# Patient Record
Sex: FEMALE | Race: WHITE | NOT HISPANIC OR LATINO | URBAN - METROPOLITAN AREA
[De-identification: names, ages, dates, MRNs, and addresses within clinical notes are randomized per-mention and may not be internally consistent; named-entity substitution may affect disease eponyms.]

---

## 2021-06-25 ENCOUNTER — OUTPATIENT (OUTPATIENT)
Dept: OUTPATIENT SERVICES | Facility: HOSPITAL | Age: 46
LOS: 1 days | End: 2021-06-25
Payer: COMMERCIAL

## 2021-06-25 DIAGNOSIS — Z00.00 ENCOUNTER FOR GENERAL ADULT MEDICAL EXAMINATION WITHOUT ABNORMAL FINDINGS: ICD-10-CM

## 2021-06-25 PROCEDURE — 71046 X-RAY EXAM CHEST 2 VIEWS: CPT

## 2021-06-25 PROCEDURE — 71046 X-RAY EXAM CHEST 2 VIEWS: CPT | Mod: 26

## 2024-01-19 ENCOUNTER — INPATIENT (INPATIENT)
Facility: HOSPITAL | Age: 49
LOS: 6 days | Discharge: HOME | End: 2024-01-26
Attending: PSYCHIATRY & NEUROLOGY | Admitting: PSYCHIATRY & NEUROLOGY
Payer: COMMERCIAL

## 2024-01-19 VITALS
TEMPERATURE: 98 F | RESPIRATION RATE: 18 BRPM | SYSTOLIC BLOOD PRESSURE: 147 MMHG | HEART RATE: 81 BPM | DIASTOLIC BLOOD PRESSURE: 90 MMHG | OXYGEN SATURATION: 100 %

## 2024-01-19 DIAGNOSIS — F29 UNSPECIFIED PSYCHOSIS NOT DUE TO A SUBSTANCE OR KNOWN PHYSIOLOGICAL CONDITION: ICD-10-CM

## 2024-01-19 LAB
ALBUMIN SERPL ELPH-MCNC: 4.5 G/DL — SIGNIFICANT CHANGE UP (ref 3.3–5)
ALP SERPL-CCNC: 86 U/L — SIGNIFICANT CHANGE UP (ref 40–120)
ALT FLD-CCNC: 21 U/L — SIGNIFICANT CHANGE UP (ref 4–33)
AMPHET UR-MCNC: NEGATIVE — SIGNIFICANT CHANGE UP
ANION GAP SERPL CALC-SCNC: 12 MMOL/L — SIGNIFICANT CHANGE UP (ref 7–14)
APAP SERPL-MCNC: <10 UG/ML — LOW (ref 15–25)
APPEARANCE UR: ABNORMAL
AST SERPL-CCNC: 24 U/L — SIGNIFICANT CHANGE UP (ref 4–32)
BACTERIA # UR AUTO: ABNORMAL /HPF
BARBITURATES UR SCN-MCNC: NEGATIVE — SIGNIFICANT CHANGE UP
BASOPHILS # BLD AUTO: 0.03 K/UL — SIGNIFICANT CHANGE UP (ref 0–0.2)
BASOPHILS NFR BLD AUTO: 0.3 % — SIGNIFICANT CHANGE UP (ref 0–2)
BENZODIAZ UR-MCNC: NEGATIVE — SIGNIFICANT CHANGE UP
BILIRUB SERPL-MCNC: 1.6 MG/DL — HIGH (ref 0.2–1.2)
BILIRUB UR-MCNC: NEGATIVE — SIGNIFICANT CHANGE UP
BUN SERPL-MCNC: 8 MG/DL — SIGNIFICANT CHANGE UP (ref 7–23)
CALCIUM SERPL-MCNC: 9.3 MG/DL — SIGNIFICANT CHANGE UP (ref 8.4–10.5)
CAST: 2 /LPF — SIGNIFICANT CHANGE UP (ref 0–4)
CHLORIDE SERPL-SCNC: 101 MMOL/L — SIGNIFICANT CHANGE UP (ref 98–107)
CO2 SERPL-SCNC: 25 MMOL/L — SIGNIFICANT CHANGE UP (ref 22–31)
COCAINE METAB.OTHER UR-MCNC: NEGATIVE — SIGNIFICANT CHANGE UP
COLOR SPEC: YELLOW — SIGNIFICANT CHANGE UP
COVID-19 SPIKE DOMAIN AB INTERP: POSITIVE
COVID-19 SPIKE DOMAIN ANTIBODY RESULT: >250 U/ML — HIGH
CREAT SERPL-MCNC: 0.71 MG/DL — SIGNIFICANT CHANGE UP (ref 0.5–1.3)
CREATININE URINE RESULT, DAU: 126 MG/DL — SIGNIFICANT CHANGE UP
DIFF PNL FLD: NEGATIVE — SIGNIFICANT CHANGE UP
EGFR: 105 ML/MIN/1.73M2 — SIGNIFICANT CHANGE UP
EOSINOPHIL # BLD AUTO: 0.1 K/UL — SIGNIFICANT CHANGE UP (ref 0–0.5)
EOSINOPHIL NFR BLD AUTO: 1 % — SIGNIFICANT CHANGE UP (ref 0–6)
ETHANOL SERPL-MCNC: <10 MG/DL — SIGNIFICANT CHANGE UP
FLUAV AG NPH QL: SIGNIFICANT CHANGE UP
FLUBV AG NPH QL: SIGNIFICANT CHANGE UP
GLUCOSE SERPL-MCNC: 129 MG/DL — HIGH (ref 70–99)
GLUCOSE UR QL: NEGATIVE MG/DL — SIGNIFICANT CHANGE UP
HCT VFR BLD CALC: 44.1 % — SIGNIFICANT CHANGE UP (ref 34.5–45)
HGB BLD-MCNC: 14.8 G/DL — SIGNIFICANT CHANGE UP (ref 11.5–15.5)
IANC: 7.9 K/UL — HIGH (ref 1.8–7.4)
IMM GRANULOCYTES NFR BLD AUTO: 0.4 % — SIGNIFICANT CHANGE UP (ref 0–0.9)
KETONES UR-MCNC: ABNORMAL MG/DL
LEUKOCYTE ESTERASE UR-ACNC: NEGATIVE — SIGNIFICANT CHANGE UP
LYMPHOCYTES # BLD AUTO: 1.14 K/UL — SIGNIFICANT CHANGE UP (ref 1–3.3)
LYMPHOCYTES # BLD AUTO: 11.5 % — LOW (ref 13–44)
MCHC RBC-ENTMCNC: 28 PG — SIGNIFICANT CHANGE UP (ref 27–34)
MCHC RBC-ENTMCNC: 33.6 GM/DL — SIGNIFICANT CHANGE UP (ref 32–36)
MCV RBC AUTO: 83.5 FL — SIGNIFICANT CHANGE UP (ref 80–100)
METHADONE UR-MCNC: NEGATIVE — SIGNIFICANT CHANGE UP
MONOCYTES # BLD AUTO: 0.69 K/UL — SIGNIFICANT CHANGE UP (ref 0–0.9)
MONOCYTES NFR BLD AUTO: 7 % — SIGNIFICANT CHANGE UP (ref 2–14)
NEUTROPHILS # BLD AUTO: 7.9 K/UL — HIGH (ref 1.8–7.4)
NEUTROPHILS NFR BLD AUTO: 79.8 % — HIGH (ref 43–77)
NITRITE UR-MCNC: NEGATIVE — SIGNIFICANT CHANGE UP
NRBC # BLD: 0 /100 WBCS — SIGNIFICANT CHANGE UP (ref 0–0)
NRBC # FLD: 0 K/UL — SIGNIFICANT CHANGE UP (ref 0–0)
OPIATES UR-MCNC: NEGATIVE — SIGNIFICANT CHANGE UP
OXYCODONE UR-MCNC: NEGATIVE — SIGNIFICANT CHANGE UP
PCP SPEC-MCNC: SIGNIFICANT CHANGE UP
PCP UR-MCNC: NEGATIVE — SIGNIFICANT CHANGE UP
PH UR: 6.5 — SIGNIFICANT CHANGE UP (ref 5–8)
PLATELET # BLD AUTO: 202 K/UL — SIGNIFICANT CHANGE UP (ref 150–400)
POTASSIUM SERPL-MCNC: 3.8 MMOL/L — SIGNIFICANT CHANGE UP (ref 3.5–5.3)
POTASSIUM SERPL-SCNC: 3.8 MMOL/L — SIGNIFICANT CHANGE UP (ref 3.5–5.3)
PROT SERPL-MCNC: 8.5 G/DL — HIGH (ref 6–8.3)
PROT UR-MCNC: NEGATIVE MG/DL — SIGNIFICANT CHANGE UP
RBC # BLD: 5.28 M/UL — HIGH (ref 3.8–5.2)
RBC # FLD: 13 % — SIGNIFICANT CHANGE UP (ref 10.3–14.5)
RBC CASTS # UR COMP ASSIST: 2 /HPF — SIGNIFICANT CHANGE UP (ref 0–4)
REVIEW: SIGNIFICANT CHANGE UP
RSV RNA NPH QL NAA+NON-PROBE: SIGNIFICANT CHANGE UP
SALICYLATES SERPL-MCNC: <0.3 MG/DL — LOW (ref 15–30)
SARS-COV-2 IGG+IGM SERPL QL IA: >250 U/ML — HIGH
SARS-COV-2 IGG+IGM SERPL QL IA: POSITIVE
SARS-COV-2 RNA SPEC QL NAA+PROBE: SIGNIFICANT CHANGE UP
SARS-COV-2 RNA SPEC QL NAA+PROBE: SIGNIFICANT CHANGE UP
SODIUM SERPL-SCNC: 138 MMOL/L — SIGNIFICANT CHANGE UP (ref 135–145)
SP GR SPEC: 1.01 — SIGNIFICANT CHANGE UP (ref 1–1.03)
SQUAMOUS # UR AUTO: 21 /HPF — HIGH (ref 0–5)
THC UR QL: NEGATIVE — SIGNIFICANT CHANGE UP
TOXICOLOGY SCREEN, DRUGS OF ABUSE, SERUM RESULT: SIGNIFICANT CHANGE UP
TSH SERPL-MCNC: 2.08 UIU/ML — SIGNIFICANT CHANGE UP (ref 0.27–4.2)
UROBILINOGEN FLD QL: 0.2 MG/DL — SIGNIFICANT CHANGE UP (ref 0.2–1)
WBC # BLD: 9.9 K/UL — SIGNIFICANT CHANGE UP (ref 3.8–10.5)
WBC # FLD AUTO: 9.9 K/UL — SIGNIFICANT CHANGE UP (ref 3.8–10.5)
WBC UR QL: 4 /HPF — SIGNIFICANT CHANGE UP (ref 0–5)

## 2024-01-19 PROCEDURE — 70450 CT HEAD/BRAIN W/O DYE: CPT | Mod: 26,MA

## 2024-01-19 PROCEDURE — 99285 EMERGENCY DEPT VISIT HI MDM: CPT

## 2024-01-19 RX ORDER — RISPERIDONE 4 MG/1
0.5 TABLET ORAL
Refills: 0 | Status: DISCONTINUED | OUTPATIENT
Start: 2024-01-19 | End: 2024-01-22

## 2024-01-19 RX ORDER — HALOPERIDOL DECANOATE 100 MG/ML
2 INJECTION INTRAMUSCULAR EVERY 6 HOURS
Refills: 0 | Status: DISCONTINUED | OUTPATIENT
Start: 2024-01-19 | End: 2024-01-26

## 2024-01-19 RX ORDER — ACETAMINOPHEN 500 MG
650 TABLET ORAL EVERY 6 HOURS
Refills: 0 | Status: DISCONTINUED | OUTPATIENT
Start: 2024-01-19 | End: 2024-01-26

## 2024-01-19 RX ORDER — HALOPERIDOL DECANOATE 100 MG/ML
2.5 INJECTION INTRAMUSCULAR ONCE
Refills: 0 | Status: DISCONTINUED | OUTPATIENT
Start: 2024-01-19 | End: 2024-01-26

## 2024-01-19 RX ADMIN — Medication 0.5 MILLIGRAM(S): at 20:25

## 2024-01-19 RX ADMIN — RISPERIDONE 0.5 MILLIGRAM(S): 4 TABLET ORAL at 20:25

## 2024-01-19 RX ADMIN — Medication 2 MILLIGRAM(S): at 06:56

## 2024-01-19 NOTE — ED BEHAVIORAL HEALTH NOTE - BEHAVIORAL HEALTH NOTE
Writer called patient's daughter Ce  who provided the following information. Patient resides with her , father in law, son and daughter.  Patient is employed as an  for the past year.  She leaves the home 3am and returns around 3pm.  Patient last worked on Wednesday.  Patient does not have a primary care doctor, but states she went to see a doctor yesterday because she wasn't feeling well.  The doctor noted patient's blood pressure to be elevated and prescribed and anti inflammatory and an antibiotic, but she does not know the names.  States patient took two doses yesterday.  She does not know which doctor patient went to see as it's not someone patient sees regularly.    She states patient today was delusional, hearing voices and paranoid.  She reports for the past 2 weeks patient has had decreased sleep and saying some things like she's stressed at work, people at work want to get her fired and she will lose her house.  Today patient was talking about someone wanting to hurt everyone, patient locked the doors to the home, said people are working together.  She verbalized hearing family members voices mixed with strangers voices and conspiracies about killing them so she decided to bring patient to the emergency room.  She reports patient has a schizophrenic brother who was diagnosed when he was in college.  Patient has no history of violence, no history of suicidal threats.  She states patient took knives out today and placed them on the couch in case someone came in to the home to protect herself.    She is unsure if the family wants patient admitted to the hospital.  Writer requested her father's phone number.      Writer called patient's  Дмитрий  who states patient's symptoms started yesterday.  He then stated patient was paranoid two weeks ago and reporting auditory hallucinations that something was going to happen.  He states patient was worried about their kids son 21 and daughter 19.  He reports patient went to the doctor yesterday and got some antibiotics, but doesn't know which one, states his kids would know.  He states his kids would know which doctor she went to.  He states he didn't know patient was trying to protect herself with knives and feels if patient needs to get admitted to the hospital he is in agreement.  Patient has no previous psychiatric admissions.  Writer reserved a bed at [facility].

## 2024-01-19 NOTE — ED BEHAVIORAL HEALTH ASSESSMENT NOTE - RISK ASSESSMENT
Risk factors:  not receiving treatment    Protective factors: no current SIIP/HIIP, no h/o SA/SIB, no h/o psych admissions, no access to weapons, no active substance abuse, good physical health,  engaged in work or school, dependent children, domiciled, intact marriage, social supports    Overall, pt is a low risk of harm to self but is a moderate-high risk of harm to others and ___ requires psychiatric admission for safety and stabilization.

## 2024-01-19 NOTE — ED ADULT NURSE NOTE - OBJECTIVE STATEMENT
Pt arrives to room 13 with daughter at bedside. Pt is A&Ox4, respirations are even and unlabored. Ambulatory at baseline. Pt C/O intermittent auditory hallucinations x2 weeks. Pt states the voices are threatening to harm her and her family. Daughter states pt started barricading their home front door and pulling out knifes in effort to "protect the family". Daughter denies pt attempting to hurt her or her family. Pt denies SI, HI, visual and tactile hallucinations. Pt denies PMHx, medication use, or drug use. Pt endorses "a lot of stress in her life lately". Abdomen is soft and nondistended. Capillary refill is 2 seconds bilaterally. Skin is clean, dry, and intact. 20G IV placed in right AC. Medicated as per MD order. Bed in lowest position, safety maintained.

## 2024-01-19 NOTE — ED BEHAVIORAL HEALTH NOTE - BEHAVIORAL HEALTH NOTE
Attempted to see patient, but she is sleeping. Pt received Ativan earlier for anxiety. Dtr at bedside. Pt is not able to sustain long enough period of wakefulness to appropriately participate with interview.  Will return to re-attempt.   labs/chart reviewed.

## 2024-01-19 NOTE — ED ADULT TRIAGE NOTE - CHIEF COMPLAINT QUOTE
Patient c/o insomnia x 2 days and auditory hallucinations x 2 weeks. States voices are telling her that someone is after her family. Denies SI/HI, visual hallucinations and drug/ETOH use. Denies psychiatric and medical hx.

## 2024-01-19 NOTE — ED BEHAVIORAL HEALTH ASSESSMENT NOTE - DESCRIPTION
Pt has been calm, was given Ativan earlier for anxiety.    Vital Signs Last 24 Hrs  T(C): 37.2 (19 Jan 2024 11:50), Max: 37.2 (19 Jan 2024 11:50)  T(F): 99 (19 Jan 2024 11:50), Max: 99 (19 Jan 2024 11:50)  HR: 74 (19 Jan 2024 11:50) (74 - 81)  BP: 117/60 (19 Jan 2024 11:50) (117/60 - 173/70)  BP(mean): 97 (19 Jan 2024 07:51) (97 - 97)  RR: 18 (19 Jan 2024 11:50) (17 - 18)  SpO2: 100% (19 Jan 2024 11:50) (100% - 100%)    Parameters below as of 19 Jan 2024 11:50  Patient On (Oxygen Delivery Method): room air denies , 2 adult children; works as a  ([employer]);

## 2024-01-19 NOTE — ED BEHAVIORAL HEALTH ASSESSMENT NOTE - PSYCHIATRIC ISSUES AND PLAN (INCLUDE STANDING AND PRN MEDICATION)
Start risperdal 0.5mg bid; haldol 2.5 po/im q6hrs; Ativan 1mg po/im q 6prn Start risperdal 0.5mg bid; Ativan 0.5mg hs; haldol 2.5 po/im q6hrs; Ativan 1mg po/im q 6prn

## 2024-01-19 NOTE — ED BEHAVIORAL HEALTH ASSESSMENT NOTE - SUMMARY
Pt is a 47yo , employed female, with 2 adult children. Pt has no prior psychiatric history nor medical history. She was brought to the ED out of concern by her family for paranoia and insomnia.  Here in the ED, the pt has been calm, cooperative. She received Ativan PO prior to initial attempt at interview then slept for a few hours. Pt is now awake, and alert.  Pt currently presents with paranoid, AH, insomnia, with bright affect. Pt's insight into her symptoms is poor= pt does not seem to mind AH, as she correlates them to her Sikh beliefs. Family is concerned. Pt is acting out on her delusions, now taking out knives to protect her family, though she denies any actual current intent to harm anyone. Pt is a danger to others due to her psychotic sxs of new onset.   Pt does not appear to have a clear cut prodromal depressive/mood episode, but has a stressful trigger.  Pt is not safe to return home at this time, as there is a moderate to high chance she will continue to act out in protection of her family and this may inadvertently  harm self/others. Discussed with pt, who due to poor insight, does not understand and agree. Family is in agreement. Pt will be admitted on an emegency basis at this time. Medically cleared. Pt is a 47yo , employed female, with 2 adult children. Pt has no prior psychiatric history nor medical history. She was brought to the ED out of concern by her family for paranoia and insomnia.  Here in the ED, the pt has been calm, cooperative. She received Ativan PO prior to initial attempt at interview then slept for a few hours. Pt is now awake, and alert.  Pt currently presents with paranoid, AH, insomnia, with bright affect. Pt's insight into her symptoms is poor= pt does not seem to mind AH, as she correlates them to her Protestant beliefs. Family is concerned. Pt is acting out on her delusions, now taking out knives to protect her family, though she denies any actual current intent to harm anyone. Pt is a danger to others due to her psychotic sxs of new onset.   Pt does not appear to have a clear cut prodromal depressive/mood episode, but has a stressful trigger.  Pt is not safe to return home at this time, as there is a moderate to high chance she will continue to act out in protection of her family and this may inadvertently  harm self/others. Discussed with pt, who due to poor insight, does not understand and agree. Support and psychoed was provided. Family is in agreement. Pt will be admitted on an emegency basis at this time. Medically cleared.

## 2024-01-19 NOTE — ED BEHAVIORAL HEALTH ASSESSMENT NOTE - MEDICAL ISSUES AND PLAN (INCLUDE STANDING AND PRN MEDICATION)
spoke with ED Attending- pt was started on Zpack and steroids yesterday, but sounds like viral in origin- does not recommend continuing at this time.

## 2024-01-19 NOTE — ED BEHAVIORAL HEALTH ASSESSMENT NOTE - VIOLENCE RISK FACTORS:
Feeling of being under threat and being unable to control threat/Lack of insight into violence risk/need for treatment

## 2024-01-19 NOTE — ED BEHAVIORAL HEALTH ASSESSMENT NOTE - HPI (INCLUDE ILLNESS QUALITY, SEVERITY, DURATION, TIMING, CONTEXT, MODIFYING FACTORS, ASSOCIATED SIGNS AND SYMPTOMS)
Pt is a 49yo , employed female, with 2 adult children. Pt has no prior psychiatric history nor medical history. She was brought to the ED out of concern by her family for paranoia and insomnia.  Here in the ED, the pt has been calm, cooperative. She received Ativan PO prior to initial attempt at interview then slept for a few hours. Pt is now awake, and alert.    Pt is interviewed with daughter at bedside, whom she wishes to translate periodically. Pt reports that she has not been sleeping well for the past 2 weeks. Now that she slept some, she "feels much better."  Pt says that she has been worried about her family, and that she ahs been experiencing voices. Initially she reports that the AH were recent, but daughter clarified, which pt then agreed, that she has been hearing them since mid December. Pt says that the voices do not tell her to harm herself or others, but that they tell her to protect her family, and that someone is going to come harm them. Pt suspects that the people behind this threat are her own family members, one of which lives next door to her. She says that she has seen them, but has not said or done anything. She has no intention of harming them.   Pt reports that she feels God has given her more clarity and she understands why the voices are there- to help her and protect her and her family. In response to her concern, the pt has been placing lisa behind doors at home as added protection against people coming inside. Today, she took knives out and laid them on the couch "in case someone came in."    Pt denies that her mood has changed during this time. She has had one episode of crying, when she says she was missing her family who are in [facility]. She has had the sleep disturbances, where she is not sleeping regularly, and she reports she has been awake for the past 48 hours. She denies any change in her ADLs but reports decreased appetite. She denies any increase in goal directed activity, denies any spending/racing thoughts. Pt denies any SI/HI/I/P. Pt denies any substance/alcohol use. She was recently placed on a Z-Pack yesterday for "throat issues".     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 She denies that anyone has approached her about this.   Collateral obtained by , please see  note.

## 2024-01-19 NOTE — ED PROVIDER NOTE - PHYSICAL EXAMINATION
General: Patient alert, paranoid at times  Skin: Dry and intact  HEENT: Head atraumatic. Oral mucosa moist.   Eyes: Conjunctiva normal  Cardiac: Regular rhythm and rate. No pretibial edema b/l  Respiratory: Lungs clear b/l and symmetric. No respiratory distress. Able to speak in complete sentences.  Gastrointestinal: Abdomen soft, nondistended, nontender  Musculoskeletal: Moves all extremities spontaneously  Neurological: alert and oriented to person, place, and time  Psychiatric: Calm and cooperative

## 2024-01-19 NOTE — ED BEHAVIORAL HEALTH ASSESSMENT NOTE - NSSUICPROTFACT_PSY_ALL_CORE
Responsibility to children, family, or others/Identifies reasons for living/Supportive social network of family or friends/Cultural, spiritual and/or moral attitudes against suicide/Engaged in work or school/Religion beliefs

## 2024-01-19 NOTE — ED ADULT NURSE REASSESSMENT NOTE - NS ED NURSE REASSESS COMMENT FT1
Alert, calm, continues to be confused and states she is here for high blood pressure, no signs of distress, pending psych, fall precautions maintained
Alert, calm, no change in status, pending [facility] transport, fall precautions maintained
Received patient from night RN. Patient is asleep at this time, RR even and unlabored, NAD. As per daughter she has not slept in days and just fell asleep. Patient pending CT scan at this time, plan of care reviewed, safety maintained, will continue to monitor
Patient is resting comfortably with daughter at bedside. A/O x 4, NAD, RR even and unlabored, no c/o pain, pending transport at this time. Eating dinner with daughter at bedside

## 2024-01-19 NOTE — ED PROVIDER NOTE - CLINICAL SUMMARY MEDICAL DECISION MAKING FREE TEXT BOX
48-year-old female with no reported past medical history, family history of brother with schizophrenia diagnosed after college, presenting to ER with family for 2 weeks of insomnia and auditory hallucinations, states she feels that there are voices coming from cameras watching her at her job, also from pictures at home.  Per daughter, patient was very paranoid at home, laid out 9/2 because she was worried people were after her, also barricaded the door at home.  No prior similar behaviors in past.  No alcohol or drug use endorsed.  No headaches, focal weakness, focal numbness, or daily medications.    Exam  Awake, alert, oriented  Normal gait  Patient calm and cooperative but does appear paranoid at times  Pupils midsize bilaterally  Moving all extremities spontaneously    Assessment/plan  Possible new onset psychiatric disease/psychosis, but less likely due to patient's age   will evaluate for metabolic or infectious derangements or intracranial abnormality with labs, drug screen, UA, TSH, CT head  If medical workup nonremarkable, will consult psych

## 2024-01-20 LAB
CULTURE RESULTS: SIGNIFICANT CHANGE UP
SPECIMEN SOURCE: SIGNIFICANT CHANGE UP

## 2024-01-20 PROCEDURE — 99222 1ST HOSP IP/OBS MODERATE 55: CPT

## 2024-01-20 RX ORDER — TRAZODONE HCL 50 MG
50 TABLET ORAL AT BEDTIME
Refills: 0 | Status: DISCONTINUED | OUTPATIENT
Start: 2024-01-20 | End: 2024-01-26

## 2024-01-20 RX ADMIN — RISPERIDONE 0.5 MILLIGRAM(S): 4 TABLET ORAL at 20:30

## 2024-01-20 RX ADMIN — Medication 0.5 MILLIGRAM(S): at 20:30

## 2024-01-20 RX ADMIN — RISPERIDONE 0.5 MILLIGRAM(S): 4 TABLET ORAL at 09:24

## 2024-01-20 NOTE — BH INPATIENT PSYCHIATRY ASSESSMENT NOTE - CURRENT MEDICATION
MEDICATIONS  (STANDING):  LORazepam     Tablet 0.5 milliGRAM(s) Oral at bedtime  risperiDONE   Tablet 0.5 milliGRAM(s) Oral two times a day    MEDICATIONS  (PRN):  acetaminophen     Tablet .. 650 milliGRAM(s) Oral every 6 hours PRN Moderate Pain (4 - 6)  haloperidol     Tablet 2 milliGRAM(s) Oral every 6 hours PRN agitation  haloperidol    Injectable 2.5 milliGRAM(s) IntraMuscular once PRN combative behavior  LORazepam     Tablet 1 milliGRAM(s) Oral every 6 hours PRN Anxiety  LORazepam   Injectable 2 milliGRAM(s) IntraMuscular once PRN Assaultive behavior

## 2024-01-20 NOTE — BH INPATIENT PSYCHIATRY ASSESSMENT NOTE - NSBHMETABOLIC_PSY_ALL_CORE_FT
BMI: BMI (kg/m2): 38.8 (01-19-24 @ 13:25)  HbA1c:   Glucose:   BP: 103/68 (01-19-24 @ 17:56) (98/51 - 173/70)Vital Signs Last 24 Hrs  T(C): 37 (01-19-24 @ 17:56), Max: 37.2 (01-19-24 @ 11:50)  T(F): 98.6 (01-19-24 @ 17:56), Max: 99 (01-19-24 @ 11:50)  HR: 74 (01-19-24 @ 17:56) (74 - 80)  BP: 103/68 (01-19-24 @ 17:56) (98/51 - 173/70)  BP(mean): 97 (01-19-24 @ 07:51) (97 - 97)  RR: 16 (01-19-24 @ 17:56) (16 - 18)  SpO2: 96% (01-19-24 @ 17:56) (96% - 100%)    Orthostatic VS  01-19-24 @ 13:25  Lying BP: --/-- HR: --  Sitting BP: 135/72 HR: 78  Standing BP: 128/79 HR: 81  Site: --  Mode: --    Lipid Panel:  BMI: BMI (kg/m2): 38.8 (01-19-24 @ 13:25)  HbA1c:   Glucose:   BP: 103/68 (01-19-24 @ 17:56) (98/51 - 173/70)Vital Signs Last 24 Hrs  T(C): 37 (01-20-24 @ 07:38), Max: 37.2 (01-19-24 @ 11:50)  T(F): 98.6 (01-20-24 @ 07:38), Max: 99 (01-19-24 @ 11:50)  HR: 74 (01-19-24 @ 17:56) (74 - 75)  BP: 103/68 (01-19-24 @ 17:56) (98/51 - 117/60)  BP(mean): --  RR: 17 (01-20-24 @ 07:38) (16 - 18)  SpO2: 99% (01-20-24 @ 07:38) (96% - 100%)    Orthostatic VS  01-20-24 @ 07:38  Lying BP: --/-- HR: --  Sitting BP: 128/68 HR: 72  Standing BP: 120/70 HR: 80  Site: --  Mode: --  Orthostatic VS  01-19-24 @ 13:25  Lying BP: --/-- HR: --  Sitting BP: 135/72 HR: 78  Standing BP: 128/79 HR: 81  Site: --  Mode: --    Lipid Panel:

## 2024-01-20 NOTE — BH INPATIENT PSYCHIATRY ASSESSMENT NOTE - NSBHATTESTAPPBILLTIME_PSY_A_CORE
I attest my time as STEPHANE is greater than 50% of the total combined time spent on qualifying patient care activities. I have reviewed and verified the documentation.

## 2024-01-20 NOTE — BH INPATIENT PSYCHIATRY ASSESSMENT NOTE - NSSUICPROTFACT_PSY_ALL_CORE
Responsibility to children, family, or others/Identifies reasons for living/Supportive social network of family or friends/Cultural, spiritual and/or moral attitudes against suicide/Engaged in work or school/Taoist beliefs

## 2024-01-20 NOTE — BH INPATIENT PSYCHIATRY ASSESSMENT NOTE - NSICDXPASTMEDICALHX_GEN_ALL_CORE_FT
PAST MEDICAL HISTORY:  No pertinent past medical history      Tremfya Counseling: I discussed with the patient the risks of guselkumab including but not limited to immunosuppression, serious infections, worsening of inflammatory bowel disease and drug reactions.  The patient understands that monitoring is required including a PPD at baseline and must alert us or the primary physician if symptoms of infection or other concerning signs are noted.

## 2024-01-20 NOTE — BH INPATIENT PSYCHIATRY ASSESSMENT NOTE - NSBHCHARTREVIEWVS_PSY_A_CORE FT
Vital Signs Last 24 Hrs  T(C): 37 (01-19-24 @ 17:56), Max: 37.2 (01-19-24 @ 11:50)  T(F): 98.6 (01-19-24 @ 17:56), Max: 99 (01-19-24 @ 11:50)  HR: 74 (01-19-24 @ 17:56) (74 - 80)  BP: 103/68 (01-19-24 @ 17:56) (98/51 - 173/70)  BP(mean): 97 (01-19-24 @ 07:51) (97 - 97)  RR: 16 (01-19-24 @ 17:56) (16 - 18)  SpO2: 96% (01-19-24 @ 17:56) (96% - 100%)    Orthostatic VS  01-19-24 @ 13:25  Lying BP: --/-- HR: --  Sitting BP: 135/72 HR: 78  Standing BP: 128/79 HR: 81  Site: --  Mode: --   Vital Signs Last 24 Hrs  T(C): 37 (01-20-24 @ 07:38), Max: 37.2 (01-19-24 @ 11:50)  T(F): 98.6 (01-20-24 @ 07:38), Max: 99 (01-19-24 @ 11:50)  HR: 74 (01-19-24 @ 17:56) (74 - 75)  BP: 103/68 (01-19-24 @ 17:56) (98/51 - 117/60)  BP(mean): --  RR: 17 (01-20-24 @ 07:38) (16 - 18)  SpO2: 99% (01-20-24 @ 07:38) (96% - 100%)    Orthostatic VS  01-20-24 @ 07:38  Lying BP: --/-- HR: --  Sitting BP: 128/68 HR: 72  Standing BP: 120/70 HR: 80  Site: --  Mode: --  Orthostatic VS  01-19-24 @ 13:25  Lying BP: --/-- HR: --  Sitting BP: 135/72 HR: 78  Standing BP: 128/79 HR: 81  Site: --  Mode: --

## 2024-01-20 NOTE — BH INPATIENT PSYCHIATRY ASSESSMENT NOTE - ATTENDING COMMENTS
On service for this 48 year old  female, 2 adult  dependents, patient is currently employed.  She domiciled in private residence with …...  Patient has no PPH.  Patient has no prior inpatient hospitalizations. Patient was brought to ED by family due to concerns of paranoia. Patient hospitalized due to psychotic decompensation, paranoia, bizarre behaviors.    Patient admits that she had not been sleeping prior to coming to the hospital. She is tangential in TP, with minimal insight into the reasons that brought her into the hospital. Pt had been paranoid and collecting weapons to protect herself prior to being hospitalized. Pt was admitted appropriately on an emergency status.

## 2024-01-20 NOTE — BH INPATIENT PSYCHIATRY ASSESSMENT NOTE - NSBHASSESSSUMMFT_PSY_ALL_CORE
Patient is a 48 year old  female, 2 adult  dependents, patient is currently employed.  She domiciled in private residence with …...  Patient has no PPH.  Patient has no prior inpatient hospitalizations. Patient was brought to ED by family due to concerns of paranoia. Patient hospitalized due to psychotic decompensation, paranoia, bizarre behaviors.   Patient is admitted on [address] on a 9.39 legal status. Patient requires inpatient hospitalization due to symptoms of mental illness so severe that they significantly interfere with activities of daily living, and presents a potential danger to self as a result of psychotic decompensation. She is requiring 24-hour care at this time as a result, for psychiatric stabilization and safety.    Plan:  >Legal: 9.39  >Obs: Routine, no current SI. no need for CO, patient not expected to pose risk to self or others in controlled inpatient setting  >Psychiatric Meds:   -Risperdal 0.5mg BID for psychosis  -Ativan 0.5mg qhs for anxiety  PRN medications:  Ativan 2mg oral Q6HR PRN for agitation and anxiety.  Haldol 5mg oral Q6HR PRN for agitation.   Benadryl 50mg oral Q6HR PRN for agitation.   >Labs: Admission labs reviewed, no acute findings. U-tox negative.  CT Head unremarkable.  Labs pending for tomorrow: A1c and Lipid panel. Hold antipsychotics if QTc >500  >Medical: No acute concerns. No consultations needed at this time. No indication for CIWA. Patient with consistently stable VS, no visible physical symptoms of withdrawal. During the course of treatment, will collaborate with medical team to manage medical issues.  >Diet: Regular  >Social: milieu/structured therapy  >Treatment Interventions: Groups and Individual Therapy/CBT, Motivational counseling for substance abuse related issues.   >Dispo: Collateral and dispo planning pending further symptom and medication optimization             Patient is a 48 year old  female, 2 adult  dependents, patient is currently employed.  She domiciled in private residence with …...  Patient has no PPH.  Patient has no prior inpatient hospitalizations. Patient was brought to ED by family due to concerns of paranoia. Patient hospitalized due to psychotic decompensation, paranoia, bizarre behaviors.   Patient is admitted on [facility] on a 9.39 legal status. Patient requires inpatient hospitalization due to symptoms of mental illness so severe that they significantly interfere with activities of daily living, and presents a potential danger to self as a result of psychotic decompensation. She is requiring 24-hour care at this time as a result, for psychiatric stabilization and safety.    Plan:  >Legal: 9.39  >Obs: Routine, no current SI. no need for CO, patient not expected to pose risk to self or others in controlled inpatient setting  >Psychiatric Meds:   -Risperdal 0.5mg BID for psychosis  -Ativan 0.5mg qhs for anxiety  PRN medications:  Ativan 1mg oral Q6HR PRN for agitation and anxiety.  Haldol 2.5mg oral Q6HR PRN for agitation.    Trazodone 50mg for insomnia prn  >Labs: Admission labs reviewed, no acute findings. U-tox negative.  CT Head unremarkable.  Labs pending for tomorrow: A1c and Lipid panel. Hold antipsychotics if QTc >500  >Medical: No acute concerns. No consultations needed at this time. No indication for CIWA. Patient with consistently stable VS, no visible physical symptoms of withdrawal. During the course of treatment, will collaborate with medical team to manage medical issues.  >Diet: Regular  >Social: milieu/structured therapy  >Treatment Interventions: Groups and Individual Therapy/CBT, Motivational counseling for substance abuse related issues.   >Dispo: Collateral and dispo planning pending further symptom and medication optimization

## 2024-01-20 NOTE — BH INPATIENT PSYCHIATRY ASSESSMENT NOTE - HPI (INCLUDE ILLNESS QUALITY, SEVERITY, DURATION, TIMING, CONTEXT, MODIFYING FACTORS, ASSOCIATED SIGNS AND SYMPTOMS)
Patient was seen and evaluated, chart, medications and labs reviewed. On service for this 48 year old  female, 2 adult  dependents, patient is currently employed.  She domiciled in private residence with …...  Patient has no PPH.  Patient has no prior inpatient hospitalizations. Patient was brought to ED by family due to concerns of paranoia. Patient hospitalized due to psychotic decompensation, paranoia, bizarre behaviors.   Patient is admitted on [facility] on a 9.39 legal status. I have reviewed the initial psychiatric assessment in the electronic medical record, including the history of present illness, past psychiatric history, family/social history (no pertinent changes), and exam, and have confirmed the salient findings dated  1/19/24.  As per chart review, transferring records indicated the following:  Pt is a 47yo , employed female, with 2 adult children. Pt has no prior psychiatric history nor medical history. She was brought to the ED out of concern by her family for paranoia and insomnia.  Here in the ED, the pt has been calm, cooperative. She received Ativan PO prior to initial attempt at interview then slept for a few hours. Pt is now awake, and alert.  Pt is interviewed with daughter at bedside, whom she wishes to translate periodically. Pt reports that she has not been sleeping well for the past 2 weeks. Now that she slept some, she "feels much better."  Pt says that she has been worried about her family, and that she ahs been experiencing voices. Initially she reports that the AH were recent, but daughter clarified, which pt then agreed, that she has been hearing them since mid December. Pt says that the voices do not tell her to harm herself or others, but that they tell her to protect her family, and that someone is going to come harm them. Pt suspects that the people behind this threat are her own family members, one of which lives next door to her. She says that she has seen them, but has not said or done anything. She has no intention of harming them.   Pt reports that she feels God has given her more clarity and she understands why the voices are there- to help her and protect her and her family. In response to her concern, the pt has been placing lisa behind doors at home as added protection against people coming inside. Today, she took knives out and laid them on the couch "in case someone came in." Pt denies that her mood has changed during this time. She has had one episode of crying, when she says she was missing her family who are in [facility]. She has had the sleep disturbances, where she is not sleeping regularly, and she reports she has been awake for the past 48 hours. She denies any change in her ADLs but reports decreased appetite. She denies any increase in goal directed activity, denies any spending/racing thoughts. Pt denies any SI/HI/I/P. Pt denies any substance/alcohol use. She was recently placed on a Z-Pack yesterday for "throat issues".   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 She denies that anyone has approached her about this.     On unit: information came from chart review and patient interview       Patient was seen and evaluated, chart, medications and labs reviewed. On service for this 48 year old  female, 2 adult  dependents, patient is currently employed.  She domiciled in private residence with …...  Patient has no PPH.  Patient has no prior inpatient hospitalizations. Patient was brought to ED by family due to concerns of paranoia. Patient hospitalized due to psychotic decompensation, paranoia, bizarre behaviors.   Patient is admitted on [facility] on a 9.39 legal status. I have reviewed the initial psychiatric assessment in the electronic medical record, including the history of present illness, past psychiatric history, family/social history (no pertinent changes), and exam, and have confirmed the salient findings dated  1/19/24.  As per chart review, transferring records indicated the following:  Pt is a 49yo , employed female, with 2 adult children. Pt has no prior psychiatric history nor medical history. She was brought to the ED out of concern by her family for paranoia and insomnia.  Here in the ED, the pt has been calm, cooperative. She received Ativan PO prior to initial attempt at interview then slept for a few hours. Pt is now awake, and alert.  Pt is interviewed with daughter at bedside, whom she wishes to translate periodically. Pt reports that she has not been sleeping well for the past 2 weeks. Now that she slept some, she "feels much better."  Pt says that she has been worried about her family, and that she ahs been experiencing voices. Initially she reports that the AH were recent, but daughter clarified, which pt then agreed, that she has been hearing them since mid December. Pt says that the voices do not tell her to harm herself or others, but that they tell her to protect her family, and that someone is going to come harm them. Pt suspects that the people behind this threat are her own family members, one of which lives next door to her. She says that she has seen them, but has not said or done anything. She has no intention of harming them.   Pt reports that she feels God has given her more clarity and she understands why the voices are there- to help her and protect her and her family. In response to her concern, the pt has been placing lisa behind doors at home as added protection against people coming inside. Today, she took knives out and laid them on the couch "in case someone came in." Pt denies that her mood has changed during this time. She has had one episode of crying, when she says she was missing her family who are in [facility]. She has had the sleep disturbances, where she is not sleeping regularly, and she reports she has been awake for the past 48 hours. She denies any change in her ADLs but reports decreased appetite. She denies any increase in goal directed activity, denies any spending/racing thoughts. Pt denies any SI/HI/I/P. Pt denies any substance/alcohol use. She was recently placed on a Z-Pack yesterday for "throat issues".   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 She denies that anyone has approached her about this.     On unit: information came from chart review and patient interview  Patient is discussed with nursing team. Patient compliant with medications, no SE reported (no tremors, akathisia or EPS). No behavioral concerns on unit, no prns for aggression. Nursing reports patient eating and sleeping well.  No aggression on unit, no prns given.     Patient is seen in dayroom, amenable to  interview, she is  calm, cooperative, over inclusive on presentation. Reports that she was feeling “I’m better now”  She denies any mood dysregulation, reports anxiety.  Denies SI/SIB/HI and engages in safety plan. Patient denies depressive symptoms including: anhedonia,  hopelessness, poor concentration, excessive worrying, irritability. She denies any change in her ADLs but reports decreased appetite and sleep. Denies any current or past suicidal thoughts, or negative thoughts to self-harm. No thoughts to harm others.    Discussed events leading to current admission.   Pt states “my family brought me to the hospital”  Patient she has not been sleeping for several days because “I work 2 jobs I felt so stressed”.  Reports AH for several months, believes it was due to poor sleep for several weeks.  Denies CAH, reports voices tells her to protect herself and her family. Reports commentary voices “voices talking to each other” Denies hearing voices on daily basis “not all the time”  Endorses paranoia believes that another family member is going to come harm them. Unable to explain why she believes that other family members are going to harm her.    Pt reports that she feels God is helping her and has given her more clarity “I’m a God believer” .  Reports that is why she is hearing voices, the voices are there- to help her to protect her family. Patient reports she has taken steps to protect herself and family, reports she took knives out and laid them on the couch and barricated the doors with multiple chairs "in case someone came in." States she wanted her children to sit with her all the time so she can protect them.    Patient denies any hx or current VH, delusions, psychotic disorganization, mind reading abilities, thought insertion, ideas of reference, special spaulding, or thought broadcasting. Patient reports  family history of mental illness in primary degree relative, brother diagnosed with schizophrenia.  Denies history of aggression or violence. No access to firearm. Patient denies any symptoms suggestive of active kristen: (denied grandiosity/ racing thoughts/ increased goal directed activities or engaged in risk taking behavior/ no pressured speech/ no elevated mood/ denied any increased in energy level causing sleep disruption), no signs of catatonia (with no signs of mutism, negativism, stereotypy, echolalia, echopraxia, posturing or rigidity. She was alert and able to answer appropriately to questions during exam. She denies obsessive, intrusive and persistent thoughts or compulsive, ritualistic acts are reported. Patient denies any active legal issues, is not currently under any kind of court supervision.   Denies substance use, no alcohol, no vaping/tobacco. reviewed admitting u-tox is negative.  Patient denied past  trauma. No PMH. Patient reports has not had her menstrual cycle for 3 months, experiencing menstrual irregularity, (?pre-menopausal)

## 2024-01-21 LAB
A1C WITH ESTIMATED AVERAGE GLUCOSE RESULT: 6 % — HIGH (ref 4–5.6)
CHOLEST SERPL-MCNC: 175 MG/DL — SIGNIFICANT CHANGE UP
ESTIMATED AVERAGE GLUCOSE: 126 — SIGNIFICANT CHANGE UP
HDLC SERPL-MCNC: 44 MG/DL — LOW
LIPID PNL WITH DIRECT LDL SERPL: 111 MG/DL — HIGH
NON HDL CHOLESTEROL: 131 MG/DL — HIGH
TRIGL SERPL-MCNC: 102 MG/DL — SIGNIFICANT CHANGE UP

## 2024-01-21 PROCEDURE — 99232 SBSQ HOSP IP/OBS MODERATE 35: CPT

## 2024-01-21 RX ADMIN — Medication 0.5 MILLIGRAM(S): at 21:45

## 2024-01-21 RX ADMIN — RISPERIDONE 0.5 MILLIGRAM(S): 4 TABLET ORAL at 21:45

## 2024-01-21 RX ADMIN — RISPERIDONE 0.5 MILLIGRAM(S): 4 TABLET ORAL at 08:26

## 2024-01-21 NOTE — PSYCHIATRIC REHAB INITIAL EVALUATION - NSBHEDULEVEL_PSY_ALL_CORE
Pt shared that she completed her education in [employer] and never attended any school in [employer]

## 2024-01-21 NOTE — BH INPATIENT PSYCHIATRY PROGRESS NOTE - NSBHMETABOLIC_PSY_ALL_CORE_FT
BMI: BMI (kg/m2): 38.8 (01-19-24 @ 13:25)  HbA1c:   Glucose:   BP: 103/68 (01-19-24 @ 17:56) (98/51 - 173/70)Vital Signs Last 24 Hrs  T(C): --  T(F): --  HR: --  BP: --  BP(mean): --  RR: --  SpO2: --    Orthostatic VS  01-20-24 @ 07:38  Lying BP: --/-- HR: --  Sitting BP: 128/68 HR: 72  Standing BP: 120/70 HR: 80  Site: --  Mode: --  Orthostatic VS  01-19-24 @ 13:25  Lying BP: --/-- HR: --  Sitting BP: 135/72 HR: 78  Standing BP: 128/79 HR: 81  Site: --  Mode: --    Lipid Panel:  BMI: BMI (kg/m2): 38.8 (01-19-24 @ 13:25)  HbA1c:   Glucose:   BP: 103/68 (01-19-24 @ 17:56) (98/51 - 173/70)Vital Signs Last 24 Hrs  T(C): 36.5 (01-21-24 @ 07:38), Max: 36.5 (01-21-24 @ 07:38)  T(F): 97.7 (01-21-24 @ 07:38), Max: 97.7 (01-21-24 @ 07:38)  HR: --  BP: --  BP(mean): --  RR: 17 (01-21-24 @ 07:38) (17 - 17)  SpO2: --    Orthostatic VS  01-21-24 @ 07:38  Lying BP: --/-- HR: --  Sitting BP: 113/76 HR: 73  Standing BP: 126/74 HR: 87  Site: --  Mode: --  Orthostatic VS  01-20-24 @ 07:38  Lying BP: --/-- HR: --  Sitting BP: 128/68 HR: 72  Standing BP: 120/70 HR: 80  Site: --  Mode: --  Orthostatic VS  01-19-24 @ 13:25  Lying BP: --/-- HR: --  Sitting BP: 135/72 HR: 78  Standing BP: 128/79 HR: 81  Site: --  Mode: --    Lipid Panel:

## 2024-01-21 NOTE — PSYCHIATRIC REHAB INITIAL EVALUATION - NSBHPROFILEREVIEW_PSY_ALL_CORE
Chart reviewed with [facility] RN at time of encounter.    Advise move back to most recent Blu[facility] dosing found in chart, 16.5mg/week with INR recheck early next week    Margaux Espinoza, PharmD BCACP  Anticoagulation Clinical Pharmacist     Yes

## 2024-01-21 NOTE — PSYCHIATRIC REHAB INITIAL EVALUATION - NSBHPRRECOMMEND_PSY_ALL_CORE
Writer met with Pt to orient her to Psych Rehab department and its functions. Pt was receptive. Pt was engaged, cooperative and forthcoming but discharge focused  during the session. Pt identified her primary reason of hospitalization as insomnia due to hours of her job. Pt adds that she only needed meds for her sleeping  and denied symptoms. Pt shared that she has a supportive family and denies any social stressors. Pt added that she works so her kids would not take any student loan.  Pt denied any use of substances or alcohol.   Per the chart She was brought to the ED out of concern by her family for paranoia and insomnia. Juanr was able to establish a collaborative goal with Pt to work on the next seven days. Juanr provided the Pt a copy of unit’s schedule and encouraged her to attend groups. Pt was receptive.  Psychiatric Rehabilitation staff will continue to provide encouragement, support, psychotherapy, and psychoeducation to assist the Pt in the progression of her treatment goal and engagement with activities.

## 2024-01-21 NOTE — BH INPATIENT PSYCHIATRY PROGRESS NOTE - NSBHCHARTREVIEWVS_PSY_A_CORE FT
Vital Signs Last 24 Hrs  T(C): --  T(F): --  HR: --  BP: --  BP(mean): --  RR: --  SpO2: --    Orthostatic VS  01-20-24 @ 07:38  Lying BP: --/-- HR: --  Sitting BP: 128/68 HR: 72  Standing BP: 120/70 HR: 80  Site: --  Mode: --  Orthostatic VS  01-19-24 @ 13:25  Lying BP: --/-- HR: --  Sitting BP: 135/72 HR: 78  Standing BP: 128/79 HR: 81  Site: --  Mode: --   Vital Signs Last 24 Hrs  T(C): 36.5 (01-21-24 @ 07:38), Max: 36.5 (01-21-24 @ 07:38)  T(F): 97.7 (01-21-24 @ 07:38), Max: 97.7 (01-21-24 @ 07:38)  HR: --  BP: --  BP(mean): --  RR: 17 (01-21-24 @ 07:38) (17 - 17)  SpO2: --    Orthostatic VS  01-21-24 @ 07:38  Lying BP: --/-- HR: --  Sitting BP: 113/76 HR: 73  Standing BP: 126/74 HR: 87  Site: --  Mode: --  Orthostatic VS  01-20-24 @ 07:38  Lying BP: --/-- HR: --  Sitting BP: 128/68 HR: 72  Standing BP: 120/70 HR: 80  Site: --  Mode: --  Orthostatic VS  01-19-24 @ 13:25  Lying BP: --/-- HR: --  Sitting BP: 135/72 HR: 78  Standing BP: 128/79 HR: 81  Site: --  Mode: --

## 2024-01-21 NOTE — BH INPATIENT PSYCHIATRY PROGRESS NOTE - CURRENT MEDICATION
MEDICATIONS  (STANDING):  LORazepam     Tablet 0.5 milliGRAM(s) Oral at bedtime  risperiDONE   Tablet 0.5 milliGRAM(s) Oral two times a day    MEDICATIONS  (PRN):  acetaminophen     Tablet .. 650 milliGRAM(s) Oral every 6 hours PRN Moderate Pain (4 - 6)  haloperidol     Tablet 2 milliGRAM(s) Oral every 6 hours PRN agitation  haloperidol    Injectable 2.5 milliGRAM(s) IntraMuscular once PRN combative behavior  LORazepam     Tablet 1 milliGRAM(s) Oral every 6 hours PRN Anxiety  LORazepam   Injectable 2 milliGRAM(s) IntraMuscular once PRN Assaultive behavior  traZODone 50 milliGRAM(s) Oral at bedtime PRN Insomnia

## 2024-01-21 NOTE — PSYCHIATRIC REHAB INITIAL EVALUATION - NSPROEDAREADYLEARN_GEN_A_NUR
Attempted to contact patient. Patient didn't answer at this time. A letter will be sent out to the patient's address. acuteness of illness/anxiety

## 2024-01-22 PROCEDURE — 99232 SBSQ HOSP IP/OBS MODERATE 35: CPT

## 2024-01-22 RX ORDER — RISPERIDONE 4 MG/1
1 TABLET ORAL AT BEDTIME
Refills: 0 | Status: DISCONTINUED | OUTPATIENT
Start: 2024-01-22 | End: 2024-01-24

## 2024-01-22 RX ORDER — RISPERIDONE 4 MG/1
0.5 TABLET ORAL DAILY
Refills: 0 | Status: DISCONTINUED | OUTPATIENT
Start: 2024-01-22 | End: 2024-01-24

## 2024-01-22 RX ADMIN — Medication 0.5 MILLIGRAM(S): at 20:59

## 2024-01-22 RX ADMIN — RISPERIDONE 1 MILLIGRAM(S): 4 TABLET ORAL at 21:00

## 2024-01-22 RX ADMIN — RISPERIDONE 0.5 MILLIGRAM(S): 4 TABLET ORAL at 08:20

## 2024-01-22 NOTE — BH INPATIENT PSYCHIATRY PROGRESS NOTE - NSBHCHARTREVIEWVS_PSY_A_CORE FT
Vital Signs Last 24 Hrs  T(C): 36.1 (01-22-24 @ 07:38), Max: 36.1 (01-22-24 @ 07:38)  T(F): 97 (01-22-24 @ 07:38), Max: 97 (01-22-24 @ 07:38)  HR: --  BP: --  BP(mean): --  RR: 17 (01-22-24 @ 07:38) (17 - 17)  SpO2: --    Orthostatic VS  01-22-24 @ 07:38  Lying BP: --/-- HR: --  Sitting BP: 127/73 HR: 87  Standing BP: 120/74 HR: 92  Site: --  Mode: --  Orthostatic VS  01-21-24 @ 07:38  Lying BP: --/-- HR: --  Sitting BP: 113/76 HR: 73  Standing BP: 126/74 HR: 87  Site: --  Mode: --

## 2024-01-22 NOTE — BH INPATIENT PSYCHIATRY PROGRESS NOTE - NSBHMETABOLIC_PSY_ALL_CORE_FT
BMI: BMI (kg/m2): 38.8 (01-19-24 @ 13:25)  HbA1c: A1C with Estimated Average Glucose Result: 6.0 % (01-21-24 @ 09:15)    Glucose:   BP: --Vital Signs Last 24 Hrs  T(C): 36.1 (01-22-24 @ 07:38), Max: 36.1 (01-22-24 @ 07:38)  T(F): 97 (01-22-24 @ 07:38), Max: 97 (01-22-24 @ 07:38)  HR: --  BP: --  BP(mean): --  RR: 17 (01-22-24 @ 07:38) (17 - 17)  SpO2: --    Orthostatic VS  01-22-24 @ 07:38  Lying BP: --/-- HR: --  Sitting BP: 127/73 HR: 87  Standing BP: 120/74 HR: 92  Site: --  Mode: --  Orthostatic VS  01-21-24 @ 07:38  Lying BP: --/-- HR: --  Sitting BP: 113/76 HR: 73  Standing BP: 126/74 HR: 87  Site: --  Mode: --    Lipid Panel: Date/Time: 01-21-24 @ 09:15  Cholesterol, Serum: 175  LDL Cholesterol Calculated: 111  HDL Cholesterol, Serum: 44  Total Cholesterol/HDL Ration Measurement: --  Triglycerides, Serum: 102

## 2024-01-22 NOTE — BH INPATIENT PSYCHIATRY PROGRESS NOTE - CURRENT MEDICATION
MEDICATIONS  (STANDING):  LORazepam     Tablet 0.5 milliGRAM(s) Oral at bedtime  risperiDONE   Tablet 0.5 milliGRAM(s) Oral daily  risperiDONE   Tablet 1 milliGRAM(s) Oral at bedtime    MEDICATIONS  (PRN):  acetaminophen     Tablet .. 650 milliGRAM(s) Oral every 6 hours PRN Moderate Pain (4 - 6)  haloperidol     Tablet 2 milliGRAM(s) Oral every 6 hours PRN agitation  haloperidol    Injectable 2.5 milliGRAM(s) IntraMuscular once PRN combative behavior  LORazepam     Tablet 1 milliGRAM(s) Oral every 6 hours PRN Anxiety  LORazepam   Injectable 2 milliGRAM(s) IntraMuscular once PRN Assaultive behavior  traZODone 50 milliGRAM(s) Oral at bedtime PRN Insomnia   MEDICATIONS  (STANDING):  LORazepam     Tablet 0.5 milliGRAM(s) Oral at bedtime  risperiDONE   Tablet 1 milliGRAM(s) Oral at bedtime  risperiDONE   Tablet 0.5 milliGRAM(s) Oral daily    MEDICATIONS  (PRN):  acetaminophen     Tablet .. 650 milliGRAM(s) Oral every 6 hours PRN Moderate Pain (4 - 6)  haloperidol     Tablet 2 milliGRAM(s) Oral every 6 hours PRN agitation  haloperidol    Injectable 2.5 milliGRAM(s) IntraMuscular once PRN combative behavior  LORazepam     Tablet 1 milliGRAM(s) Oral every 6 hours PRN Anxiety  LORazepam   Injectable 2 milliGRAM(s) IntraMuscular once PRN Assaultive behavior  traZODone 50 milliGRAM(s) Oral at bedtime PRN Insomnia

## 2024-01-23 PROCEDURE — 99232 SBSQ HOSP IP/OBS MODERATE 35: CPT

## 2024-01-23 RX ADMIN — RISPERIDONE 0.5 MILLIGRAM(S): 4 TABLET ORAL at 08:25

## 2024-01-23 RX ADMIN — Medication 0.5 MILLIGRAM(S): at 20:23

## 2024-01-23 RX ADMIN — RISPERIDONE 1 MILLIGRAM(S): 4 TABLET ORAL at 20:22

## 2024-01-23 NOTE — BH TREATMENT PLAN - NSTXPSYCHOINTERRN_PSY_ALL_CORE
Pt. encouraged to report triggers/stressors that precipitate hallucinations and report to staff when they are hallucinating. Pt. encouraged to identify effective coping skills and utlilize effective coping skills when hallucinations begin. Pt. encouraged to seek staff support if known coping skills are not effective that way pt. can brainstorm effective coping skills with staff

## 2024-01-23 NOTE — BH INPATIENT PSYCHIATRY PROGRESS NOTE - CURRENT MEDICATION
MEDICATIONS  (STANDING):  LORazepam     Tablet 0.5 milliGRAM(s) Oral at bedtime  risperiDONE   Tablet 0.5 milliGRAM(s) Oral daily  risperiDONE   Tablet 1 milliGRAM(s) Oral at bedtime    MEDICATIONS  (PRN):  acetaminophen     Tablet .. 650 milliGRAM(s) Oral every 6 hours PRN Moderate Pain (4 - 6)  haloperidol     Tablet 2 milliGRAM(s) Oral every 6 hours PRN agitation  haloperidol    Injectable 2.5 milliGRAM(s) IntraMuscular once PRN combative behavior  LORazepam     Tablet 1 milliGRAM(s) Oral every 6 hours PRN Anxiety  LORazepam   Injectable 2 milliGRAM(s) IntraMuscular once PRN Assaultive behavior  traZODone 50 milliGRAM(s) Oral at bedtime PRN Insomnia

## 2024-01-23 NOTE — BH INPATIENT PSYCHIATRY PROGRESS NOTE - NSBHCHARTREVIEWVS_PSY_A_CORE FT
Vital Signs Last 24 Hrs  T(C): 36.6 (01-23-24 @ 07:28), Max: 36.6 (01-23-24 @ 07:28)  T(F): 97.8 (01-23-24 @ 07:28), Max: 97.8 (01-23-24 @ 07:28)  HR: --  BP: --  BP(mean): --  RR: 18 (01-23-24 @ 07:28) (18 - 18)  SpO2: --    Orthostatic VS  01-23-24 @ 07:28  Lying BP: --/-- HR: --  Sitting BP: 120/69 HR: 68  Standing BP: 125/68 HR: 78  Site: --  Mode: --  Orthostatic VS  01-22-24 @ 07:38  Lying BP: --/-- HR: --  Sitting BP: 127/73 HR: 87  Standing BP: 120/74 HR: 92  Site: --  Mode: --

## 2024-01-23 NOTE — BH INPATIENT PSYCHIATRY PROGRESS NOTE - NSBHMETABOLIC_PSY_ALL_CORE_FT
BMI: BMI (kg/m2): 38.8 (01-19-24 @ 13:25)  HbA1c: A1C with Estimated Average Glucose Result: 6.0 % (01-21-24 @ 09:15)    Glucose:   BP: --Vital Signs Last 24 Hrs  T(C): 36.6 (01-23-24 @ 07:28), Max: 36.6 (01-23-24 @ 07:28)  T(F): 97.8 (01-23-24 @ 07:28), Max: 97.8 (01-23-24 @ 07:28)  HR: --  BP: --  BP(mean): --  RR: 18 (01-23-24 @ 07:28) (18 - 18)  SpO2: --    Orthostatic VS  01-23-24 @ 07:28  Lying BP: --/-- HR: --  Sitting BP: 120/69 HR: 68  Standing BP: 125/68 HR: 78  Site: --  Mode: --  Orthostatic VS  01-22-24 @ 07:38  Lying BP: --/-- HR: --  Sitting BP: 127/73 HR: 87  Standing BP: 120/74 HR: 92  Site: --  Mode: --    Lipid Panel: Date/Time: 01-21-24 @ 09:15  Cholesterol, Serum: 175  LDL Cholesterol Calculated: 111  HDL Cholesterol, Serum: 44  Total Cholesterol/HDL Ration Measurement: --  Triglycerides, Serum: 102

## 2024-01-24 PROCEDURE — 99231 SBSQ HOSP IP/OBS SF/LOW 25: CPT

## 2024-01-24 RX ORDER — RISPERIDONE 4 MG/1
1.5 TABLET ORAL AT BEDTIME
Refills: 0 | Status: DISCONTINUED | OUTPATIENT
Start: 2024-01-24 | End: 2024-01-25

## 2024-01-24 RX ADMIN — RISPERIDONE 1.5 MILLIGRAM(S): 4 TABLET ORAL at 20:01

## 2024-01-24 RX ADMIN — RISPERIDONE 0.5 MILLIGRAM(S): 4 TABLET ORAL at 09:23

## 2024-01-24 RX ADMIN — Medication 0.5 MILLIGRAM(S): at 20:01

## 2024-01-24 NOTE — BH INPATIENT PSYCHIATRY PROGRESS NOTE - CURRENT MEDICATION
MEDICATIONS  (STANDING):  LORazepam     Tablet 0.5 milliGRAM(s) Oral at bedtime  risperiDONE   Tablet 1.5 milliGRAM(s) Oral at bedtime    MEDICATIONS  (PRN):  acetaminophen     Tablet .. 650 milliGRAM(s) Oral every 6 hours PRN Moderate Pain (4 - 6)  haloperidol     Tablet 2 milliGRAM(s) Oral every 6 hours PRN agitation  haloperidol    Injectable 2.5 milliGRAM(s) IntraMuscular once PRN combative behavior  LORazepam     Tablet 1 milliGRAM(s) Oral every 6 hours PRN Anxiety  LORazepam   Injectable 2 milliGRAM(s) IntraMuscular once PRN Assaultive behavior  traZODone 50 milliGRAM(s) Oral at bedtime PRN Insomnia

## 2024-01-24 NOTE — BH INPATIENT PSYCHIATRY PROGRESS NOTE - NSBHCHARTREVIEWVS_PSY_A_CORE FT
Vital Signs Last 24 Hrs  T(C): 36.4 (01-24-24 @ 07:15), Max: 36.4 (01-24-24 @ 07:15)  T(F): 97.5 (01-24-24 @ 07:15), Max: 97.5 (01-24-24 @ 07:15)  HR: --  BP: --  BP(mean): --  RR: 18 (01-24-24 @ 07:15) (18 - 18)  SpO2: --    Orthostatic VS  01-24-24 @ 07:15  Lying BP: --/-- HR: --  Sitting BP: 117/82 HR: 78  Standing BP: 115/70 HR: 88  Site: --  Mode: --  Orthostatic VS  01-23-24 @ 07:28  Lying BP: --/-- HR: --  Sitting BP: 120/69 HR: 68  Standing BP: 125/68 HR: 78  Site: --  Mode: --

## 2024-01-24 NOTE — BH INPATIENT PSYCHIATRY PROGRESS NOTE - NSBHMETABOLIC_PSY_ALL_CORE_FT
BMI: BMI (kg/m2): 38.8 (01-19-24 @ 13:25)  HbA1c: A1C with Estimated Average Glucose Result: 6.0 % (01-21-24 @ 09:15)    Glucose:   BP: --Vital Signs Last 24 Hrs  T(C): 36.4 (01-24-24 @ 07:15), Max: 36.4 (01-24-24 @ 07:15)  T(F): 97.5 (01-24-24 @ 07:15), Max: 97.5 (01-24-24 @ 07:15)  HR: --  BP: --  BP(mean): --  RR: 18 (01-24-24 @ 07:15) (18 - 18)  SpO2: --    Orthostatic VS  01-24-24 @ 07:15  Lying BP: --/-- HR: --  Sitting BP: 117/82 HR: 78  Standing BP: 115/70 HR: 88  Site: --  Mode: --  Orthostatic VS  01-23-24 @ 07:28  Lying BP: --/-- HR: --  Sitting BP: 120/69 HR: 68  Standing BP: 125/68 HR: 78  Site: --  Mode: --    Lipid Panel: Date/Time: 01-21-24 @ 09:15  Cholesterol, Serum: 175  LDL Cholesterol Calculated: 111  HDL Cholesterol, Serum: 44  Total Cholesterol/HDL Ration Measurement: --  Triglycerides, Serum: 102

## 2024-01-25 PROCEDURE — 99231 SBSQ HOSP IP/OBS SF/LOW 25: CPT

## 2024-01-25 RX ORDER — RISPERIDONE 4 MG/1
2 TABLET ORAL AT BEDTIME
Refills: 0 | Status: DISCONTINUED | OUTPATIENT
Start: 2024-01-25 | End: 2024-01-26

## 2024-01-25 RX ORDER — RISPERIDONE 4 MG/1
1 TABLET ORAL
Qty: 30 | Refills: 0
Start: 2024-01-25 | End: 2024-02-23

## 2024-01-25 RX ADMIN — Medication 0.5 MILLIGRAM(S): at 21:10

## 2024-01-25 RX ADMIN — RISPERIDONE 2 MILLIGRAM(S): 4 TABLET ORAL at 21:09

## 2024-01-25 NOTE — BH INPATIENT PSYCHIATRY PROGRESS NOTE - NSBHATTESTBILLING_PSY_A_CORE
59267-Lyunllhndr OBS or IP - low complexity OR 25-34 mins
85232-Fyizwntwic OBS or IP - moderate complexity OR 35-49 mins
09893-Xrcbhxcupy OBS or IP - moderate complexity OR 35-49 mins
[id number]-Wuwkpqnxjo OBS or IP - low complexity OR 25-34 mins
[id number]-Acorcfpboh OBS or IP - moderate complexity OR 35-49 mins

## 2024-01-25 NOTE — BH INPATIENT PSYCHIATRY PROGRESS NOTE - NSBHFUPINTERVALHXFT_PSY_A_CORE
Patient was seen for follow up for Psychosis. Chart reviewed and case discussed in team meeting. Patient reported feeling better, getting good sleep, feels medications are helpful. Patient was hyperverbal, with pressured speech, interrupts frequently and does not allow writer to speak, however she is pleasant. Patient remains discharge focused, requesting to be discharged by the end of the week so she can go back to work on Saturday. Patient was visible on the unit and seen interacting with peers. Patient denied SIIP, AVH. Discussed increasing Risperdal to 2mg, initially she was resistant, then agreed. also agreed to adjust dose to all at night.      Collateral Ce (Daughter)- Daughter was able to review symptoms that led to patient's hospitalization including, paranoia, AH, increased anxiety, and insomnia, hyperverbal with pressured speech. Daughter reported family believes patient is doing better. Discussed medication and discharge plan, family is on board. 
Patient was seen for follow up for Psychosis. Chart reviewed and case discussed in team meeting. Patient reported feeling "good", getting good sleep. Patient remains hyperverbal, with pressured speech, does not allow writer to speak, however she is pleasant and Daughter reported this is her baseline speech. Discussed SW will work on discharge plan with potential plan for discharge Friday. Discussed considering cutting back on work hours, cutting back on caffeine and prioritizing sleep. Patient was visible on the unit and seen interacting with peers, attending groups. Patient denied SIIP, AVH.      
Patient was seen for follow up for Psychosis. Chart reviewed and case discussed in team meeting. Patient reported feeling better after getting good sleep the last few days, feels medications were helpful. Patient inquired about discharge, expressed concern about losing jobs. Patient was visible on the unit and seen interacting with peers. Patient denied SIIP, AVH. 
Patient was seen for follow up for Psychosis. Chart reviewed and case discussed in team meeting. Patient reported feeling "good", getting good sleep. Patient remains hyperverbal, with pressured speech, does not allow writer to speak, however she is pleasant. Educated patient on the importance of medication and treatment adherence after discharge. Patient was minimizing symptoms but agreed to continue treatment. Patient was visible on the unit and seen interacting with peers, attending groups. Patient denied SIIP, AVH.      
Patient was seen and evaluated, chart, medications and labs reviewed. On service for this 48 year old  female, 2 adult  dependents, patient is currently employed.  She domiciled in private residence with family.  Patient has no PPH.  Patient has no prior inpatient hospitalizations. Patient was brought to ED by family due to concerns of paranoia. Patient hospitalized due to psychotic decompensation, paranoia, bizarre behaviors.       Patient discussed with nursing team. Patient compliant with medications (however per nursing pt initially declined to take evening medications), no SE reported (no tremors, akathisia or EPS). No behavioral concerns on unit, no prns for aggression. Nursing reports patient eating and sleeping well.      Patient is seen in dayroom, amenable to  interview, she is  calm, cooperative.  Reports that she was feeling “good”  offers no complaints.  States she is here because she was not getting any sleep at home and her children brought her in.  Pt reports “I’m not crazy I don’t need medications”  She denies any mood dysregulation, reports anxiety.  Denies SI/SIB/HI and engages in safety plan.     Patient denies any current AVH, delusions, psychotic disorganization, mind reading abilities, thought insertion, ideas of reference, special spaulding, or thought broadcasting. No acute medical concerns. VSS: 97.7, 113/76, 73, 17. Continue to monitor and provide therapeutic support.

## 2024-01-25 NOTE — BH PSYCHOLOGY - GROUP THERAPY NOTE - NSBHPSYCHOLRESPCOMMENT_PSY_A_CORE FT
The patient appeared adequately groomed and casually dressed. Pt was partially engaged in the group as evidenced by reading from the worksheet. Pt was observed inappropriately smiling throughout body scan exercise without participation. Pt was appropriate with her peers and stayed for the entirety of group. Pt was encouraged to practice self-soothing skill for symptoms reduction.
The patient appeared adequately groomed and casually dressed. Pt was partially engaged in the group as evidenced by reading from the worksheet. Pt was oriented X3. Pt was appropriate with her peers and stayed for the entirety of group. Pt was encouraged to practice skill for symptoms reduction.

## 2024-01-25 NOTE — BH DISCHARGE NOTE NURSING/SOCIAL WORK/PSYCH REHAB - DISCHARGE INSTRUCTIONS AFTERCARE APPOINTMENTS
In order to check the location, date, or time of your aftercare appointment, please refer to your Discharge Instructions Document given to you upon leaving the hospital.  If you have lost the instructions please call [phone]

## 2024-01-25 NOTE — BH INPATIENT PSYCHIATRY PROGRESS NOTE - NSBHMETABOLICLABS_PSY_ALL_CORE
All labs not within last 12 months, ordered

## 2024-01-25 NOTE — BH INPATIENT PSYCHIATRY PROGRESS NOTE - NSTXPSYCHOGOAL_PSY_ALL_CORE
Will identify 1 trigger/stressor that exacerbates hallucinations

## 2024-01-25 NOTE — BH INPATIENT PSYCHIATRY PROGRESS NOTE - NSBHMSETHTPROC_PSY_A_CORE
Linear/Illogical/Impaired reasoning

## 2024-01-25 NOTE — BH PSYCHOLOGY - GROUP THERAPY NOTE - NSPSYCHOLGRPDBTPT_PSY_A_CORE FT
Patient attended a Dialectal Behavior Therapy Group (DBT) group focused on the IMPROVE skill. Group began with a brief check in asking pts to share review of skills and/or present emotions. Pt participated in a mindfulness exercise and were encouraged to share thought/reactions. Participants then learned the IMPROVE skill; Imagery, Meaning, Prayer, Relaxation, One thing in the moment, Vacation and Encouragement. The group explored practical applications of these concepts to emphasize distress tolerance and emotional regulation during their inpatient stay, prompting a collaborative a supportive environment for skills acquisition and personal growth. Group facilitator explained concepts, reinforced participation, and engaged patients in discussion.
Patient attended a Dialectal Behavior Therapy Group (DBT) group focused on Distress Tolerance. Group began with a brief check in asking pts to share review of skills and/or present emotions. Pt participated in a mindfulness exercise and were encouraged to share thought/reactions. The group focused on the Distress Tolerance module that addressed the skill of Self-Soothing through the five senses. Participant engaged in discussion in how to enhance emotional regulation. The facilitator introduced participants to prompts that focused on sight, hearing, touch, taste, and smell. Group facilitator explained concepts, reinforced participation, and engaged patients in discussion.

## 2024-01-25 NOTE — BH PSYCHOLOGY - GROUP THERAPY NOTE - TOKEN PULL-DIAGNOSIS
Primary Diagnosis:  Psychosis, unspecified psychosis type [F29]        Problem Dx:   Psychosis, unspecified psychosis type [F29]      
Primary Diagnosis:  Psychosis, unspecified psychosis type [F29]        Problem Dx:   Psychosis, unspecified psychosis type [F29]

## 2024-01-25 NOTE — BH DISCHARGE NOTE NURSING/SOCIAL WORK/PSYCH REHAB - NSDCPRGOAL_PSY_ALL_CORE
The writer met with patient to safety plan for discharge and discuss the patient’s progress throughout her inpatient stay. Patient was receptive to safety planning and readily identified warning signs, coping skills, triggers, and reasons for living. Per the patient’s chart, the patient was brought to the ED out of concern by her family for paranoia and insomnia. The patient met her psychiatric rehabilitation goal to identify and utilize 2 coping skills for her coping ineffective goal. The patient reported cooking, cleaning, spending time with her family and thinking positively as her coping skills. The patient is compliant with her medications, engages with her peers, and has good ADL’s and behavioral control. The patient denied SI/HI/AH/VH. The patient attended 85% of the Psychiatric Rehabilitation groups throughout her stay. Psychiatric Rehabilitation staff recommends that the patient engage in outpatient services for continued medication and symptom management.

## 2024-01-25 NOTE — BH INPATIENT PSYCHIATRY PROGRESS NOTE - NSBHMETABOLIC_PSY_ALL_CORE_FT
BMI: BMI (kg/m2): 38.8 (01-19-24 @ 13:25)  HbA1c: A1C with Estimated Average Glucose Result: 6.0 % (01-21-24 @ 09:15)    Glucose:   BP: --Vital Signs Last 24 Hrs  T(C): --  T(F): --  HR: --  BP: --  BP(mean): --  RR: --  SpO2: --    Orthostatic VS  01-24-24 @ 07:15  Lying BP: --/-- HR: --  Sitting BP: 117/82 HR: 78  Standing BP: 115/70 HR: 88  Site: --  Mode: --    Lipid Panel: Date/Time: 01-21-24 @ 09:15  Cholesterol, Serum: 175  LDL Cholesterol Calculated: 111  HDL Cholesterol, Serum: 44  Total Cholesterol/HDL Ration Measurement: --  Triglycerides, Serum: 102

## 2024-01-25 NOTE — BH INPATIENT PSYCHIATRY PROGRESS NOTE - NSBHCHARTREVIEWLAB_PSY_A_CORE FT
Admission labs reviewed no acute findings  utox negative  BAL <10   UA unremarkable  TSH 2.08    

## 2024-01-25 NOTE — BH PSYCHOLOGY - GROUP THERAPY NOTE - NSPSYCHOLGRPDBTPROB_PSY_A_CORE
anxiety/depressed mood/emotional dysregulation/impulsive behaviors
anxiety/depressed mood/emotional dysregulation/labile affect

## 2024-01-25 NOTE — BH INPATIENT PSYCHIATRY PROGRESS NOTE - NSTXPSYCHOINTERMD_PSY_ALL_CORE
Med management: group and milieu therapy

## 2024-01-25 NOTE — BH INPATIENT PSYCHIATRY PROGRESS NOTE - NSBHASSESSSUMMFT_PSY_ALL_CORE
Patient is a 48 year old  female, 2 adult  dependents, patient is currently employed.  She domiciled in private residence with …...  Patient has no PPH.  Patient has no prior inpatient hospitalizations. Patient was brought to ED by family due to concerns of paranoia. Patient hospitalized due to psychotic decompensation, paranoia, bizarre behaviors.   Patient is admitted on [address] on a 9.39 legal status. Patient requires inpatient hospitalization due to symptoms of mental illness so severe that they significantly interfere with activities of daily living, and presents a potential danger to self as a result of psychotic decompensation. She is requiring 24-hour care at this time as a result, for psychiatric stabilization and safety.    On assessment today, patient was pleasant, cooperative, hyperverbal with pressured speech, had improved sleep, denied SIIP/AVH.     Plan:  >Legal: 9.39  >Obs: Routine, no current SI. no need for CO, patient not expected to pose risk to self or others in controlled inpatient setting  >Psychiatric Meds:   -Risperdal 0.5mg daily and 1mg at bedtime for psychosis  -Ativan 0.5mg qhs for anxiety  PRN medications:  Ativan 1mg oral Q6HR PRN for agitation and anxiety.  Haldol 2.5mg oral Q6HR PRN for agitation.    Trazodone 50mg for insomnia prn  >Labs: Admission labs reviewed, no acute findings. U-tox negative.  CT Head unremarkable.  Labs pending for tomorrow: A1c and Lipid panel. Hold antipsychotics if QTc >500  >Medical: No acute concerns. No consultations needed at this time. No indication for CIWA. Patient with consistently stable VS, no visible physical symptoms of withdrawal. During the course of treatment, will collaborate with medical team to manage medical issues.  >Diet: Regular  >Social: milieu/structured therapy  >Treatment Interventions: Groups and Individual Therapy/CBT, Motivational counseling for substance abuse related issues.   >Dispo: Collateral and dispo planning pending further symptom and medication optimization            
Patient is a 48 year old  female, 2 adult  dependents, patient is currently employed.  She domiciled in private residence with …...  Patient has no PPH.  Patient has no prior inpatient hospitalizations. Patient was brought to ED by family due to concerns of paranoia. Patient hospitalized due to psychotic decompensation, paranoia, bizarre behaviors.   Patient is admitted on [address] [facility] on a 9.39 legal status. Patient requires inpatient hospitalization due to symptoms of mental illness so severe that they significantly interfere with activities of daily living, and presents a potential danger to self as a result of psychotic decompensation. She is requiring 24-hour care at this time as a result, for psychiatric stabilization and safety.    Plan:  >Legal: 9.39  >Obs: Routine, no current SI. no need for CO, patient not expected to pose risk to self or others in controlled inpatient setting  >Psychiatric Meds:   -Risperdal 0.5mg BID for psychosis  -Ativan 0.5mg qhs for anxiety  PRN medications:  Ativan 1mg oral Q6HR PRN for agitation and anxiety.  Haldol 2.5mg oral Q6HR PRN for agitation.    Trazodone 50mg for insomnia prn  >Labs: Admission labs reviewed, no acute findings. U-tox negative.  CT Head unremarkable.  Labs pending for tomorrow: A1c and Lipid panel. Hold antipsychotics if QTc >500  >Medical: No acute concerns. No consultations needed at this time. No indication for CIWA. Patient with consistently stable VS, no visible physical symptoms of withdrawal. During the course of treatment, will collaborate with medical team to manage medical issues.  >Diet: Regular  >Social: milieu/structured therapy  >Treatment Interventions: Groups and Individual Therapy/CBT, Motivational counseling for substance abuse related issues.   >Dispo: Collateral and dispo planning pending further symptom and medication optimization            
Patient is a 48 year old  female, 2 adult  dependents, patient is currently employed.  She domiciled in private residence with …...  Patient has no PPH.  Patient has no prior inpatient hospitalizations. Patient was brought to ED by family due to concerns of paranoia. Patient hospitalized due to psychotic decompensation, paranoia, bizarre behaviors.   Patient is admitted on [address] on a 9.39 legal status. Patient requires inpatient hospitalization due to symptoms of mental illness so severe that they significantly interfere with activities of daily living, and presents a potential danger to self as a result of psychotic decompensation. She is requiring 24-hour care at this time as a result, for psychiatric stabilization and safety.    On assessment today, patient was pleasant, cooperative, has improved sleep, denied SIIP/AVH.     Plan:  >Legal: 9.39  >Obs: Routine, no current SI. no need for CO, patient not expected to pose risk to self or others in controlled inpatient setting  >Psychiatric Meds:   -Risperdal 0.5mg daily and 1mg at bedtime for psychosis  -Ativan 0.5mg qhs for anxiety  PRN medications:  Ativan 1mg oral Q6HR PRN for agitation and anxiety.  Haldol 2.5mg oral Q6HR PRN for agitation.    Trazodone 50mg for insomnia prn  >Labs: Admission labs reviewed, no acute findings. U-tox negative.  CT Head unremarkable.  Labs pending for tomorrow: A1c and Lipid panel. Hold antipsychotics if QTc >500  >Medical: No acute concerns. No consultations needed at this time. No indication for CIWA. Patient with consistently stable VS, no visible physical symptoms of withdrawal. During the course of treatment, will collaborate with medical team to manage medical issues.  >Diet: Regular  >Social: milieu/structured therapy  >Treatment Interventions: Groups and Individual Therapy/CBT, Motivational counseling for substance abuse related issues.   >Dispo: Collateral and dispo planning pending further symptom and medication optimization            
Patient is a 48 year old  female, 2 adult  dependents, patient is currently employed.  She domiciled in private residence with …...  Patient has no PPH.  Patient has no prior inpatient hospitalizations. Patient was brought to ED by family due to concerns of paranoia. Patient hospitalized due to psychotic decompensation, paranoia, bizarre behaviors.   Patient is admitted on [address] [facility] on a 9.39 legal status. Patient requires inpatient hospitalization due to symptoms of mental illness so severe that they significantly interfere with activities of daily living, and presents a potential danger to self as a result of psychotic decompensation. She is requiring 24-hour care at this time as a result, for psychiatric stabilization and safety.    On assessment today, patient was pleasant, hyperverbal with pressured speech, cooperative, has improved sleep, denied SIIP/AVH.     Plan:  >Legal: 9.39  >Obs: Routine, no current SI. no need for CO, patient not expected to pose risk to self or others in controlled inpatient setting  >Psychiatric Meds:   -Risperdal 0.5mg daily and 1mg at bedtime for psychosis  -Ativan 0.5mg qhs for anxiety  PRN medications:  Ativan 1mg oral Q6HR PRN for agitation and anxiety.  Haldol 2.5mg oral Q6HR PRN for agitation.    Trazodone 50mg for insomnia prn  >Labs: Admission labs reviewed, no acute findings. U-tox negative.  CT Head unremarkable.  Labs pending for tomorrow: A1c and Lipid panel. Hold antipsychotics if QTc >500  >Medical: No acute concerns. No consultations needed at this time. No indication for CIWA. Patient with consistently stable VS, no visible physical symptoms of withdrawal. During the course of treatment, will collaborate with medical team to manage medical issues.  >Diet: Regular  >Social: milieu/structured therapy  >Treatment Interventions: Groups and Individual Therapy/CBT, Motivational counseling for substance abuse related issues.   >Dispo: Collateral and dispo planning pending further symptom and medication optimization            
Patient is a 48 year old  female, 2 adult  dependents, patient is currently employed.  She domiciled in private residence with …...  Patient has no PPH.  Patient has no prior inpatient hospitalizations. Patient was brought to ED by family due to concerns of paranoia. Patient hospitalized due to psychotic decompensation, paranoia, bizarre behaviors.   Patient is admitted on [address] on a 9.39 legal status. Patient requires inpatient hospitalization due to symptoms of mental illness so severe that they significantly interfere with activities of daily living, and presents a potential danger to self as a result of psychotic decompensation. She is requiring 24-hour care at this time as a result, for psychiatric stabilization and safety.    On assessment today, patient was pleasant, cooperative, hyperverbal with pressured speech, had improved sleep, denied SIIP/AVH.     Plan:  >Legal: 9.39  >Obs: Routine, no current SI. no need for CO, patient not expected to pose risk to self or others in controlled inpatient setting  >Psychiatric Meds:   -Risperdal 2mg at bedtime for psychosis  -Ativan 0.5mg qhs for anxiety  PRN medications:  Ativan 1mg oral Q6HR PRN for agitation and anxiety.  Haldol 2.5mg oral Q6HR PRN for agitation.    Trazodone 50mg for insomnia prn  >Labs: Admission labs reviewed, no acute findings. U-tox negative.  CT Head unremarkable.  Labs pending for tomorrow: A1c and Lipid panel. Hold antipsychotics if QTc >500  >Medical: No acute concerns. No consultations needed at this time. No indication for CIWA. Patient with consistently stable VS, no visible physical symptoms of withdrawal. During the course of treatment, will collaborate with medical team to manage medical issues.  >Diet: Regular  >Social: milieu/structured therapy  >Treatment Interventions: Groups and Individual Therapy/CBT, Motivational counseling for substance abuse related issues.   >Dispo: Collateral and dispo planning pending further symptom and medication optimization

## 2024-01-25 NOTE — BH DISCHARGE NOTE NURSING/SOCIAL WORK/PSYCH REHAB - NSCDUDCCRISIS_PSY_A_CORE
[facility]  [phone] [facility] ([phone])  Text "WELL" to [phone]  Website: [email or web address]  [facility]  1 [phone] [facility] [phone])/.  [facility]  [address] [place], [place] [address] [phone]   Hours:  Monday through Friday from 9 AM to 3 PM/[facility]

## 2024-01-25 NOTE — BH INPATIENT PSYCHIATRY PROGRESS NOTE - CURRENT MEDICATION
MEDICATIONS  (STANDING):  LORazepam     Tablet 0.5 milliGRAM(s) Oral at bedtime  risperiDONE   Tablet 2 milliGRAM(s) Oral at bedtime    MEDICATIONS  (PRN):  acetaminophen     Tablet .. 650 milliGRAM(s) Oral every 6 hours PRN Moderate Pain (4 - 6)  haloperidol     Tablet 2 milliGRAM(s) Oral every 6 hours PRN agitation  haloperidol    Injectable 2.5 milliGRAM(s) IntraMuscular once PRN combative behavior  LORazepam     Tablet 1 milliGRAM(s) Oral every 6 hours PRN Anxiety  LORazepam   Injectable 2 milliGRAM(s) IntraMuscular once PRN Assaultive behavior  traZODone 50 milliGRAM(s) Oral at bedtime PRN Insomnia

## 2024-01-25 NOTE — BH PSYCHOLOGY - GROUP THERAPY NOTE - NSPSYCHOLGRPDBTINT_PSY_A_CORE
reveiwed mindfullness homework/reviewed distress tolerance, skills homework
reveiwed mindfullness homework/reviewed distress tolerance, skills homework

## 2024-01-25 NOTE — BH INPATIENT PSYCHIATRY PROGRESS NOTE - ATTENDING COMMENTS
Care was discussed and reviewed in interdisciplinary treatment team.  I, Sailaja Varela MD, have reviewed and verified the documentation.  I independently performed the documented medical decision making.    
Care was discussed and reviewed in interdisciplinary treatment team.  I, Sailaja Varela MD, have reviewed and verified the documentation.  I independently performed the documented medical decision making.    Patient presented as disorganized and with pressured speech. She was polite and was able to make her need known. Patient reported feeling worried about her job. As per patient the only reason why she is here is because she has not been sleeping. Denies any SI and has been able to contract for safety.

## 2024-01-25 NOTE — BH INPATIENT PSYCHIATRY PROGRESS NOTE - NSBHMSELANG_PSY_A_CORE
Problem: Chronic Conditions and Co-morbidities  Goal: Patient's chronic conditions and co-morbidity symptoms are monitored and maintained or improved  Outcome: Completed
No abnormalities noted

## 2024-01-25 NOTE — BH INPATIENT PSYCHIATRY PROGRESS NOTE - NSDCCRITERIA_PSY_ALL_CORE
Symptom Stabilization  CGI<=3  Outpatient services f/u  Consider [facility]  

## 2024-01-25 NOTE — BH INPATIENT PSYCHIATRY PROGRESS NOTE - NSBHCHARTREVIEWINVESTIGATE_PSY_A_CORE FT
CT Head resulted unremarkable

## 2024-01-25 NOTE — BH INPATIENT PSYCHIATRY PROGRESS NOTE - NSBHCHARTREVIEWVS_PSY_A_CORE FT
Vital Signs Last 24 Hrs  T(C): --  T(F): --  HR: --  BP: --  BP(mean): --  RR: --  SpO2: --    Orthostatic VS  01-24-24 @ 07:15  Lying BP: --/-- HR: --  Sitting BP: 117/82 HR: 78  Standing BP: 115/70 HR: 88  Site: --  Mode: --

## 2024-01-25 NOTE — BH PSYCHOLOGY - GROUP THERAPY NOTE - NSPSYCHOLGRPDBTGOAL_PSY_A_CORE
reduce mood and affective lability/reduce impulsive self-defeating behavior/improve ability to indentify feelings/reduce vulnerability to emotional dysregualation
reduce mood and affective lability/improve ability to indentify feelings/reduce vulnerability to emotional dysregualation

## 2024-01-25 NOTE — BH DISCHARGE NOTE NURSING/SOCIAL WORK/PSYCH REHAB - NSDCADDINFO1FT_PSY_ALL_CORE
This is a bridge appointment until your first official appointment on 2/29.    Crisis clinic is in the [facility] on the [facility]. The street entrance to our building is at [address] [place] [address]. The hospital parking lot can be accessed at [phone].

## 2024-01-25 NOTE — BH INPATIENT PSYCHIATRY PROGRESS NOTE - NSBHATTESTTYPEVISIT_PSY_A_CORE
On-site Attending supervising STEPHANE (99XXX codes)
STEPHANE without on-site Attending supervision
On-site Attending supervising STEPHANE (99XXX codes)

## 2024-01-25 NOTE — BH INPATIENT PSYCHIATRY PROGRESS NOTE - PRN MEDS
MEDICATIONS  (PRN):  acetaminophen     Tablet .. 650 milliGRAM(s) Oral every 6 hours PRN Moderate Pain (4 - 6)  haloperidol     Tablet 2 milliGRAM(s) Oral every 6 hours PRN agitation  haloperidol    Injectable 2.5 milliGRAM(s) IntraMuscular once PRN combative behavior  LORazepam     Tablet 1 milliGRAM(s) Oral every 6 hours PRN Anxiety  LORazepam   Injectable 2 milliGRAM(s) IntraMuscular once PRN Assaultive behavior  traZODone 50 milliGRAM(s) Oral at bedtime PRN Insomnia  

## 2024-01-25 NOTE — BH DISCHARGE NOTE NURSING/SOCIAL WORK/PSYCH REHAB - PATIENT PORTAL LINK FT
You can access the FollowMyHealth Patient Portal offered by [facility] by registering at the following website: [email or web address]. By joining [facility]’s FollowMyHealth portal, you will also be able to view your health information using other applications (apps) compatible with our system.

## 2024-01-26 VITALS — OXYGEN SATURATION: 99 % | TEMPERATURE: 98 F | RESPIRATION RATE: 18 BRPM

## 2024-01-26 PROCEDURE — 99238 HOSP IP/OBS DSCHRG MGMT 30/<: CPT

## 2024-01-26 NOTE — BH INPATIENT PSYCHIATRY DISCHARGE NOTE - HOSPITAL COURSE
Dates of hospitalization:  01/19/24-01/26/24     On admission interview, patient presented with the following signs and symptoms:  pt has been calm, cooperative. She received Ativan PO prior to initial attempt at interview then slept for a few hours. Pt is now awake, and alert.  Pt is interviewed with daughter at bedside, whom she wishes to translate periodically. Pt reports that she has not been sleeping well for the past 2 weeks. Now that she slept some, she "feels much better."  Pt says that she has been worried about her family, and that she ahs been experiencing voices. Initially she reports that the AH were recent, but daughter clarified, which pt then agreed, that she has been hearing them since mid December. Pt says that the voices do not tell her to harm herself or others, but that they tell her to protect her family, and that someone is going to come harm them. Pt suspects that the people behind this threat are her own family members, one of which lives next door to her. She says that she has seen them, but has not said or done anything. She has no intention of harming them.   Pt reports that she feels God has given her more clarity and she understands why the voices are there- to help her and protect her and her family. In response to her concern, the pt has been placing chairs behind doors at home as added protection against people coming inside. Today, she took knives out and laid them on the couch "in case someone came in." Pt denies that her mood has changed during this time. She has had one episode of crying, when she says she was missing her family who are in [facility]. She has had the sleep disturbances, where she is not sleeping regularly, and she reports she has been awake for the past 48 hours. She denies any change in her ADLs but reports decreased appetite. She denies any increase in goal directed activity, denies any spending/racing thoughts. Pt denies any SI/HI/I/P. Pt denies any substance/alcohol use. She was recently placed on a Z-Pack yesterday for "throat issues".   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 She denies that anyone has approached her about this.      Patient was started on Risperdal which was titrated to a dose of 2mg with good tolerability. Patient’s symptoms stabilized during hospitalization. At time of discharge, patient ready to go home and seek further care as an outpatient (never endorsed current or past SI).      There were no behavioral problems on the unit.  Patient did not become agitated and did not require emergent intramuscular medications or seclusion/restraints.  Patient did not self-harm on the unit. Patient remained actively engaged in treatment. Patient participated in individual, group, and milieu therapy. Patient got along appropriately with staff and peers. Daughter was contacted at time of admission to obtain collateral. Safety planning and disposition planning were performed.  Patient did not have any medical problems during this hospitalization.  There were no medical consultations.       On day of discharge, the patient has improved significantly and no longer requires inpatient treatment and care. Patient denies all suicidal and aggressive ideation, intent and plan. Patient displays no psychotic symptoms. Patient is not judged to be an acute danger to self or others at this time. She is stable for transition to outpatient level of care.         Risk Assessment: The patient is at chronic risk of harm to self and others given diagnosis of psychosis, insomnia in addition to current psychiatric hospitalization. Patient was working 7 days per week for 3 years, has financial stress related to college tuition for bother of her children. Lack of support from . Mother lives in [facility] and is ill.      Protective factors include no access to firearms, , Mother, Adult children, employment, patient engagement with treatment and desire to obtain treatment (even prior to hospitalization).     On admission the patient was felt to be at an acutely elevated risk of harm to self or others as they were suffering from anxiety, insomnia, paranoia without abortive medication or pharmacotherapy established with the goal of treating/preventing them. Over the hospital course medications were started and patient was set up with after-care plans.     Although patient has an elevated chronic risk of harm to self or others, acute risk has been addressed during inpatient hospitalization. Further hospitalization is no longer warranted. Patient is ready for transition to outpatient care for further management.        Patient will be discharged with the following DSM5 diagnoses:   1.	Psychosis NOS  2.	Insomnia      Patient will be discharged on the following medications:   1.	Risperdal 2mg at bedtime- 30 days   2.	Ativan 0.5 mg at bedtime- 30 days        1.	Will d/c home on current regimen. 2. Psychoeducation provided regarding importance of compliance with outpatient appointments and medications. 3. Pt was advised to reduce substance use (MJ/alcohol). 4. Pt was advised to dial 911, return to the ER, or visit the [facility] if they become a danger to self or others or need urgent help.       Dates of hospitalization:  01/19/24-01/26/24     On admission interview, patient presented with the following signs and symptoms:  pt has been calm, cooperative. She received Ativan PO prior to initial attempt at interview then slept for a few hours. Pt is now awake, and alert.  Pt is interviewed with daughter at bedside, whom she wishes to translate periodically. Pt reports that she has not been sleeping well for the past 2 weeks. Now that she slept some, she "feels much better."  Pt says that she has been worried about her family, and that she has been experiencing voices. Initially she reports that the AH were recent, but daughter clarified, which pt then agreed, that she has been hearing them since mid December. Pt says that the voices do not tell her to harm herself or others, but that they tell her to protect her family, and that someone is going to come harm them. Pt suspects that the people behind this threat are her own family members, one of which lives next door to her. She says that she has seen them, but has not said or done anything. She has no intention of harming them.   Pt reports that she feels God has given her more clarity and she understands why the voices are there- to help her and protect her and her family. In response to her concern, the pt has been placing chairs behind doors at home as added protection against people coming inside. Today, she took knives out and laid them on the couch "in case someone came in." Pt denies that her mood has changed during this time. She has had one episode of crying, when she says she was missing her family who are in [facility]. She has had the sleep disturbances, where she is not sleeping regularly, and she reports she has been awake for the past 48 hours. She denies any change in her ADLs but reports decreased appetite. She denies any increase in goal directed activity, denies any spending/racing thoughts. Pt denies any SI/HI/I/P. Pt denies any substance/alcohol use. She was recently placed on a Z-Pack yesterday for "throat issues".   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 She denies that anyone has approached her about this.      Patient was started on Risperdal which was titrated to a dose of 2mg with good tolerability. Patient’s symptoms stabilized during hospitalization. At time of discharge, patient ready to go home and seek further care as an outpatient (never endorsed current or past SI).      There were no behavioral problems on the unit.  Patient did not become agitated and did not require emergent intramuscular medications or seclusion/restraints.  Patient did not self-harm on the unit. Patient remained actively engaged in treatment. Patient participated in individual, group, and milieu therapy. Patient got along appropriately with staff and peers. Daughter was contacted at time of admission to obtain collateral. Safety planning and disposition planning were performed.  Patient did not have any medical problems during this hospitalization.  There were no medical consultations.       On day of discharge, the patient has improved significantly and no longer requires inpatient treatment and care. Patient denies all suicidal and aggressive ideation, intent and plan. Patient displays no psychotic symptoms. Patient is not judged to be an acute danger to self or others at this time. She is stable for transition to outpatient level of care.         Risk Assessment: The patient is at chronic risk of harm to self and others given diagnosis of psychosis, insomnia in addition to current psychiatric hospitalization. Patient was working 7 days per week for 3 years, has financial stress related to college tuition for bother of her children. Lack of support from . Mother lives in [facility] and is ill.      Protective factors include no access to firearms, , Mother, Adult children, employment, patient engagement with treatment and desire to obtain treatment (even prior to hospitalization).     On admission the patient was felt to be at an acutely elevated risk of harm to self or others as they were suffering from anxiety, insomnia, paranoia without abortive medication or pharmacotherapy established with the goal of treating/preventing them. Over the hospital course medications were started and patient was set up with after-care plans.     Although patient has an elevated chronic risk of harm to self or others, acute risk has been addressed during inpatient hospitalization. Further hospitalization is no longer warranted. Patient is ready for transition to outpatient care for further management.        Patient will be discharged with the following DSM5 diagnoses:   1.	Psychosis NOS  2.	Insomnia      Patient will be discharged on the following medications:   1.	Risperdal 2mg at bedtime- 30 days   2.	Ativan 0.5 mg at bedtime- 30 days        1.	Will d/c home on current regimen. 2. Psychoeducation provided regarding importance of compliance with outpatient appointments and medications. 3. Pt was advised to reduce substance use (MJ/alcohol). 4. Pt was advised to dial 911, return to the ER, or visit the [facility] if they become a danger to self or others or need urgent help.

## 2024-01-26 NOTE — BH INPATIENT PSYCHIATRY DISCHARGE NOTE - NSDCMRMEDTOKEN_GEN_ALL_CORE_FT
LORazepam 0.5 mg oral tablet: 1 tab(s) orally once a day (at bedtime) MDD: 0.5mg  risperiDONE 2 mg oral tablet: 1 tab(s) orally once a day (at bedtime)

## 2024-01-26 NOTE — BH INPATIENT PSYCHIATRY DISCHARGE NOTE - NSBHFUPINTERVALHXFT_PSY_A_CORE
Patient was seen for follow up for Psychosis. Chart reviewed and case discussed in team meeting. Patient reported feeling "good", getting good sleep. Patient looking forward to going home today, happy to return to family. Reinforced importance of medication and treatment adherence after discharge. Patient was visible on the unit and seen interacting with peers, attending groups. Patient denied SIIP, AVH. Informed patient about crisis center which is a resource if she or family feels she is not doing well and in case of emergency can call 911 or go to the emergency room.

## 2024-01-26 NOTE — BH INPATIENT PSYCHIATRY DISCHARGE NOTE - ATTENDING DISCHARGE PHYSICAL EXAMINATION:
Care was discussed and reviewed in interdisciplinary treatment team.  I, Sailaja Varela MD, have reviewed and verified the documentation.  I independently performed the documented medical decision making.    Patient was seen and evaluated today by this writer. Patient reported that she is feeling much better and is looking forward to be able to leave the hospital. Patient is denying any SI and has been able to contract for safety. Denies any HI or hallucinations and no delusions have been elicited. Patient has been educated about the safety plan and she understands that if at any time she feels like harming her self or harming others she can call 911 or go to any ER. During the hospital stay patient has not demonstrated any aggressive or self injurious behaviors and this has been consistent with my observations and the observations of the staff.

## 2024-01-26 NOTE — BH INPATIENT PSYCHIATRY DISCHARGE NOTE - NSBHMETABOLIC_PSY_ALL_CORE_FT
BMI: BMI (kg/m2): 38.8 (01-19-24 @ 13:25)  HbA1c: A1C with Estimated Average Glucose Result: 6.0 % (01-21-24 @ 09:15)    Glucose:   BP: --Vital Signs Last 24 Hrs  T(C): 36.7 (01-26-24 @ 07:08), Max: 36.7 (01-26-24 @ 07:08)  T(F): 98 (01-26-24 @ 07:08), Max: 98 (01-26-24 @ 07:08)  HR: --  BP: --  BP(mean): --  RR: 18 (01-26-24 @ 07:08) (18 - 18)  SpO2: 99% (01-26-24 @ 07:08) (99% - 99%)    Orthostatic VS  01-26-24 @ 07:08  Lying BP: --/-- HR: --  Sitting BP: 117/69 HR: 78  Standing BP: 107/66 HR: 88  Site: --  Mode: --    Lipid Panel: Date/Time: 01-21-24 @ 09:15  Cholesterol, Serum: 175  LDL Cholesterol Calculated: 111  HDL Cholesterol, Serum: 44  Total Cholesterol/HDL Ration Measurement: --  Triglycerides, Serum: 102

## 2024-01-26 NOTE — BH INPATIENT PSYCHIATRY DISCHARGE NOTE - HPI (INCLUDE ILLNESS QUALITY, SEVERITY, DURATION, TIMING, CONTEXT, MODIFYING FACTORS, ASSOCIATED SIGNS AND SYMPTOMS)
Patient was seen and evaluated, chart, medications and labs reviewed. On service for this 48 year old  female, 2 adult  dependents, patient is currently employed.  She domiciled in private residence with …...  Patient has no PPH.  Patient has no prior inpatient hospitalizations. Patient was brought to ED by family due to concerns of paranoia. Patient hospitalized due to psychotic decompensation, paranoia, bizarre behaviors.   Patient is admitted on [address] [facility] on a 9.39 legal status. I have reviewed the initial psychiatric assessment in the electronic medical record, including the history of present illness, past psychiatric history, family/social history (no pertinent changes), and exam, and have confirmed the salient findings dated  1/19/24.  As per chart review, transferring records indicated the following:  Pt is a 49yo , employed female, with 2 adult children. Pt has no prior psychiatric history nor medical history. She was brought to the ED out of concern by her family for paranoia and insomnia.  Here in the ED, the pt has been calm, cooperative. She received Ativan PO prior to initial attempt at interview then slept for a few hours. Pt is now awake, and alert.  Pt is interviewed with daughter at bedside, whom she wishes to translate periodically. Pt reports that she has not been sleeping well for the past 2 weeks. Now that she slept some, she "feels much better."  Pt says that she has been worried about her family, and that she ahs been experiencing voices. Initially she reports that the AH were recent, but daughter clarified, which pt then agreed, that she has been hearing them since mid December. Pt says that the voices do not tell her to harm herself or others, but that they tell her to protect her family, and that someone is going to come harm them. Pt suspects that the people behind this threat are her own family members, one of which lives next door to her. She says that she has seen them, but has not said or done anything. She has no intention of harming them.   Pt reports that she feels God has given her more clarity and she understands why the voices are there- to help her and protect her and her family. In response to her concern, the pt has been placing lisa behind doors at home as added protection against people coming inside. Today, she took knives out and laid them on the couch "in case someone came in." Pt denies that her mood has changed during this time. She has had one episode of crying, when she says she was missing her family who are in [facility]. She has had the sleep disturbances, where she is not sleeping regularly, and she reports she has been awake for the past 48 hours. She denies any change in her ADLs but reports decreased appetite. She denies any increase in goal directed activity, denies any spending/racing thoughts. Pt denies any SI/HI/I/P. Pt denies any substance/alcohol use. She was recently placed on a Z-Pack yesterday for "throat issues".   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 She denies that anyone has approached her about this.     On unit: information came from chart review and patient interview  Patient is discussed with nursing team. Patient compliant with medications, no SE reported (no tremors, akathisia or EPS). No behavioral concerns on unit, no prns for aggression. Nursing reports patient eating and sleeping well.  No aggression on unit, no prns given.     Patient is seen in dayroom, amenable to  interview, she is  calm, cooperative, over inclusive on presentation. Reports that she was feeling “I’m better now”  She denies any mood dysregulation, reports anxiety.  Denies SI/SIB/HI and engages in safety plan. Patient denies depressive symptoms including: anhedonia,  hopelessness, poor concentration, excessive worrying, irritability. She denies any change in her ADLs but reports decreased appetite and sleep. Denies any current or past suicidal thoughts, or negative thoughts to self-harm. No thoughts to harm others.    Discussed events leading to current admission.   Pt states “my family brought me to the hospital”  Patient she has not been sleeping for several days because “I work 2 jobs I felt so stressed”.  Reports AH for several months, believes it was due to poor sleep for several weeks.  Denies CAH, reports voices tells her to protect herself and her family. Reports commentary voices “voices talking to each other” Denies hearing voices on daily basis “not all the time”  Endorses paranoia believes that another family member is going to come harm them. Unable to explain why she believes that other family members are going to harm her.    Pt reports that she feels God is helping her and has given her more clarity “I’m a God believer” .  Reports that is why she is hearing voices, the voices are there- to help her to protect her family. Patient reports she has taken steps to protect herself and family, reports she took knives out and laid them on the couch and barricated the doors with multiple chairs "in case someone came in." States she wanted her children to sit with her all the time so she can protect them.    Patient denies any hx or current VH, delusions, psychotic disorganization, mind reading abilities, thought insertion, ideas of reference, special spaulding, or thought broadcasting. Patient reports  family history of mental illness in primary degree relative, brother diagnosed with schizophrenia.  Denies history of aggression or violence. No access to firearm. Patient denies any symptoms suggestive of active kristen: (denied grandiosity/ racing thoughts/ increased goal directed activities or engaged in risk taking behavior/ no pressured speech/ no elevated mood/ denied any increased in energy level causing sleep disruption), no signs of catatonia (with no signs of mutism, negativism, stereotypy, echolalia, echopraxia, posturing or rigidity. She was alert and able to answer appropriately to questions during exam. She denies obsessive, intrusive and persistent thoughts or compulsive, ritualistic acts are reported. Patient denies any active legal issues, is not currently under any kind of court supervision.   Denies substance use, no alcohol, no vaping/tobacco. reviewed admitting u-tox is negative.  Patient denied past  trauma. No PMH. Patient reports has not had her menstrual cycle for 3 months, experiencing menstrual irregularity, (?pre-menopausal)

## 2024-01-26 NOTE — BH INPATIENT PSYCHIATRY DISCHARGE NOTE - NSBHDCHANDOFFFT_PSY_ALL_CORE
Discharge summary faxed to [facility]. Discussed treatment plan and meds included my contact information Marcie Patino NP, [phone].

## 2024-01-26 NOTE — BH INPATIENT PSYCHIATRY DISCHARGE NOTE - NSDCCPCAREPLAN_GEN_ALL_CORE_FT
PRINCIPAL DISCHARGE DIAGNOSIS  Diagnosis: Psychosis, unspecified psychosis type  Assessment and Plan of Treatment:       SECONDARY DISCHARGE DIAGNOSES  Diagnosis: Insomnia  Assessment and Plan of Treatment:

## 2024-01-26 NOTE — BH INPATIENT PSYCHIATRY DISCHARGE NOTE - NSBHASSESSSUMMFT_PSY_ALL_CORE
Patient is a 48 year old  female, 2 adult  dependents, patient is currently employed.  She domiciled in private residence with …...  Patient has no PPH.  Patient has no prior inpatient hospitalizations. Patient was brought to ED by family due to concerns of paranoia. Patient hospitalized due to psychotic decompensation, paranoia, bizarre behaviors.   Patient is admitted on [facility] on a 9.39 legal status. Patient requires inpatient hospitalization due to symptoms of mental illness so severe that they significantly interfere with activities of daily living, and presents a potential danger to self as a result of psychotic decompensation. She is requiring 24-hour care at this time as a result, for psychiatric stabilization and safety.    On assessment today, patient was pleasant, cooperative, hyperverbal with pressured speech, had improved sleep, denied SIIP/AVH.     Plan:  >Legal: 9.39  >Obs: Routine, no current SI. no need for CO, patient not expected to pose risk to self or others in controlled inpatient setting  >Psychiatric Meds:   -Risperdal 2mg at bedtime for psychosis  -Ativan 0.5mg qhs for anxiety  PRN medications:  Ativan 1mg oral Q6HR PRN for agitation and anxiety.  Haldol 2.5mg oral Q6HR PRN for agitation.    Trazodone 50mg for insomnia prn  >Labs: Admission labs reviewed, no acute findings. U-tox negative.  CT Head unremarkable.  Labs pending for tomorrow: A1c and Lipid panel. Hold antipsychotics if QTc >500  >Medical: No acute concerns. No consultations needed at this time. No indication for CIWA. Patient with consistently stable VS, no visible physical symptoms of withdrawal. During the course of treatment, will collaborate with medical team to manage medical issues.  >Diet: Regular  >Social: milieu/structured therapy  >Treatment Interventions: Groups and Individual Therapy/CBT, Motivational counseling for substance abuse related issues.   >Dispo: Collateral and dispo planning pending further symptom and medication optimization

## 2024-02-01 ENCOUNTER — OUTPATIENT (OUTPATIENT)
Dept: OUTPATIENT SERVICES | Facility: HOSPITAL | Age: 49
LOS: 1 days | Discharge: STILL ADMITTED OR ADMINISTRATIVE | End: 2024-02-01
Payer: COMMERCIAL

## 2024-02-01 PROBLEM — Z78.9 OTHER SPECIFIED HEALTH STATUS: Chronic | Status: ACTIVE | Noted: 2024-01-19

## 2024-02-01 PROCEDURE — 90839 PSYTX CRISIS INITIAL 60 MIN: CPT
